# Patient Record
Sex: FEMALE | Race: WHITE | NOT HISPANIC OR LATINO | Employment: STUDENT | URBAN - METROPOLITAN AREA
[De-identification: names, ages, dates, MRNs, and addresses within clinical notes are randomized per-mention and may not be internally consistent; named-entity substitution may affect disease eponyms.]

---
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
## 2021-04-06 LAB — EXT SARS-COV-2: NEGATIVE

## 2021-05-15 ENCOUNTER — IMMUNIZATIONS (OUTPATIENT)
Dept: FAMILY MEDICINE CLINIC | Facility: HOSPITAL | Age: 21
End: 2021-05-15

## 2021-05-15 DIAGNOSIS — Z23 ENCOUNTER FOR IMMUNIZATION: Primary | ICD-10-CM

## 2021-05-15 PROCEDURE — 0001A SARS-COV-2 / COVID-19 MRNA VACCINE (PFIZER-BIONTECH) 30 MCG: CPT

## 2021-05-15 PROCEDURE — 91300 SARS-COV-2 / COVID-19 MRNA VACCINE (PFIZER-BIONTECH) 30 MCG: CPT

## 2021-06-12 ENCOUNTER — IMMUNIZATIONS (OUTPATIENT)
Dept: FAMILY MEDICINE CLINIC | Facility: HOSPITAL | Age: 21
End: 2021-06-12

## 2021-06-12 DIAGNOSIS — Z23 ENCOUNTER FOR IMMUNIZATION: Primary | ICD-10-CM

## 2021-06-12 PROCEDURE — 0002A SARS-COV-2 / COVID-19 MRNA VACCINE (PFIZER-BIONTECH) 30 MCG: CPT

## 2021-06-12 PROCEDURE — 91300 SARS-COV-2 / COVID-19 MRNA VACCINE (PFIZER-BIONTECH) 30 MCG: CPT

## 2021-08-13 ENCOUNTER — OFFICE VISIT (OUTPATIENT)
Dept: FAMILY MEDICINE CLINIC | Facility: CLINIC | Age: 21
End: 2021-08-13
Payer: COMMERCIAL

## 2021-08-13 VITALS
HEIGHT: 62 IN | BODY MASS INDEX: 20.68 KG/M2 | DIASTOLIC BLOOD PRESSURE: 76 MMHG | HEART RATE: 74 BPM | SYSTOLIC BLOOD PRESSURE: 104 MMHG | TEMPERATURE: 97.9 F | WEIGHT: 112.4 LBS | RESPIRATION RATE: 14 BRPM

## 2021-08-13 DIAGNOSIS — Z83.2 FAMILY HISTORY OF CLOTTING DISORDER: ICD-10-CM

## 2021-08-13 DIAGNOSIS — R19.8 ABNORMAL BOWEL MOVEMENT: ICD-10-CM

## 2021-08-13 DIAGNOSIS — Z00.00 ENCOUNTER FOR PHYSICAL EXAMINATION: Primary | ICD-10-CM

## 2021-08-13 DIAGNOSIS — Z13.220 LIPID SCREENING: ICD-10-CM

## 2021-08-13 PROCEDURE — 99385 PREV VISIT NEW AGE 18-39: CPT | Performed by: PHYSICIAN ASSISTANT

## 2021-08-13 PROCEDURE — 3725F SCREEN DEPRESSION PERFORMED: CPT | Performed by: PHYSICIAN ASSISTANT

## 2021-08-13 RX ORDER — ADAPALENE 3 MG/G
GEL TOPICAL
COMMUNITY
Start: 2021-05-20

## 2021-08-13 NOTE — ASSESSMENT & PLAN NOTE
Patient with problems with constipation and diarrhea and abdominal discomfort her whole life and has not seen an adult gastroenterologist   Order placed check celiac panel

## 2021-08-13 NOTE — ASSESSMENT & PLAN NOTE
Mom did bring in vaccine records that she may have on paper for this patient as she only has 1 hepatitis a vaccine which is unusual   HPV vaccine discussed highly recommended  No forms for completion today  Check fasting CMP lipid panel

## 2021-08-13 NOTE — ASSESSMENT & PLAN NOTE
Father with a history of factor 5 Leiden  Patient has been on birth control for many years now without a problem and is a nonsmoker  Check factor 5 Leiden blood titer

## 2021-08-13 NOTE — PATIENT INSTRUCTIONS
Problem List Items Addressed This Visit        Other    Encounter for physical examination - Primary      Mom did bring in vaccine records that she may have on paper for this patient as she only has 1 hepatitis a vaccine which is unusual   HPV vaccine discussed highly recommended  No forms for completion today  Check fasting CMP lipid panel  Abnormal bowel movement      Patient with problems with constipation and diarrhea and abdominal discomfort her whole life and has not seen an adult gastroenterologist   Order placed check celiac panel  Relevant Orders    Celiac Disease Antibody Profile    Ambulatory referral to Gastroenterology    Family history of clotting disorder      Father with a history of factor 5 Leiden  Patient has been on birth control for many years now without a problem and is a nonsmoker  Check factor 5 Leiden blood titer           Relevant Orders    Factor 5 leiden      Other Visit Diagnoses     Lipid screening        Relevant Orders    Lipid panel    Comprehensive metabolic panel

## 2021-08-13 NOTE — PROGRESS NOTES
[facility] PRIMARY CARE    NAME: Coby Jerome  AGE: 24 y o  SEX: female  : 2000     DATE: 2021     Assessment and Plan:     Problem List Items Addressed This Visit        Other    Encounter for physical examination - Primary      Mom did bring in vaccine records that she may have on paper for this patient as she only has 1 hepatitis a vaccine which is unusual   HPV vaccine discussed highly recommended  No forms for completion today  Check fasting CMP lipid panel  Abnormal bowel movement      Patient with problems with constipation and diarrhea and abdominal discomfort her whole life and has not seen an adult gastroenterologist   Order placed check celiac panel  Relevant Orders    Celiac Disease Antibody Profile    Ambulatory referral to Gastroenterology    Family history of clotting disorder      Father with a history of factor 5 Leiden  Patient has been on birth control for many years now without a problem and is a nonsmoker  Check factor 5 Leiden blood titer  Relevant Orders    Factor 5 leiden      Other Visit Diagnoses     Lipid screening        Relevant Orders    Lipid panel    Comprehensive metabolic panel          Immunizations and preventive care screenings were discussed with patient today  Appropriate education was printed on patient's after visit summary  Counseling:  · Alcohol/drug use: discussed moderation in alcohol intake, the recommendations for healthy alcohol use, and avoidance of illicit drug use  No follow-ups on file  Chief Complaint:     Chief Complaint   Patient presents with    Physical Exam     Patient here for annual check up and establish care      History of Present Illness:     Adult Annual Physical   Patient here for a comprehensive physical exam  The patient reports no problems  Diet and Physical Activity  · Diet/Nutrition: well balanced diet     · Exercise: no formal exercise  Depression Screening  PHQ-9 Depression Screening    PHQ-9:   Frequency of the following problems over the past two weeks:      Little interest or pleasure in doing things: 0 - not at all  Feeling down, depressed, or hopeless: 0 - not at all  PHQ-2 Score: 0       General Health  · Sleep: sleeps well  · Hearing: normal - bilateral   · Vision: goes for regular eye exams  · Dental: regular dental visits  /GYN Health  · Patient is: premenopausal  · Last menstrual period: monthly  · Contraceptive method: oral contraceptives  Review of Systems:     Review of Systems   Constitutional: Negative  HENT: Negative  Eyes: Negative  Respiratory: Negative  Cardiovascular: Negative  Gastrointestinal: Negative  Endocrine: Negative  Genitourinary: Negative  Musculoskeletal: Negative  Skin: Negative  Allergic/Immunologic: Negative  Neurological: Negative  Hematological: Negative  Psychiatric/Behavioral: Negative  Past Medical History:     Past Medical History:   Diagnosis Date    Migraines       Past Surgical History:     Past Surgical History:   Procedure Laterality Date    ADENOIDECTOMY      BREAST LUMPECTOMY Right     ESOPHAGOGASTRODUODENOSCOPY N/A 01/08/2019    Procedure: ESOPHAGOGASTRODUODENOSCOPY (EGD); Surgeon: Danuta Ronquillo MD;  Location: [facility];   Service: Pediatric Gastrointestinal    TONSILLECTOMY        Social History:     Social History     Socioeconomic History    Marital status: Single     Spouse name: None    Number of children: None    Years of education: None    Highest education level: None   Occupational History    None   Tobacco Use    Smoking status: Never Smoker    Smokeless tobacco: Never Used   Substance and Sexual Activity    Alcohol use: No    Drug use: No    Sexual activity: None   Other Topics Concern    None   Social History Narrative    None     Social Determinants of Health     Financial Resource Strain:     Difficulty of Paying Living Expenses:    Food Insecurity:     Worried About Running Out of Food in the Last Year:     [address] in the Last Year:    Transportation Needs:     Lack of Transportation (Medical):  Lack of Transportation (Non-Medical):    Physical Activity:     Days of Exercise per Week:     Minutes of Exercise per Session:    Stress:     Feeling of Stress :    Social Connections:     Frequency of Communication with Friends and Family:     Frequency of Social Gatherings with Friends and Family:     Attends Mu-ism Services:     Active Member of Clubs or Organizations:     Attends Club or Organization Meetings:     Marital Status:    Intimate Partner Violence:     Fear of Current or Ex-Partner:     Emotionally Abused:     Physically Abused:     Sexually Abused:       Family History:     Family History   Problem Relation Age of Onset    Arthritis Mother     Alcohol abuse Father     Bleeding Disorder Father     Heart disease Father     Breast cancer Maternal Grandmother     Kidney disease Maternal Uncle       Current Medications:     Current Outpatient Medications   Medication Sig Dispense Refill    Adapalene 0 3 % gel PLEASE SEE ATTACHED FOR DETAILED DIRECTIONS      clotrimazole-betamethasone (LOTRISONE) 1-0 05 % cream Apply topically 2 (two) times a day 30 g 0    SRONYX 0 1-20 MG-MCG per tablet Take 1 tablet by mouth daily  13    polyethylene glycol (GLYCOLAX) powder Take 1 capful by mouth daily (Patient not taking: Reported on 7/23/2020) 500 g 3    psyllium (METAMUCIL) 58 6 % powder Take 1 packet by mouth 3 (three) times a day (Patient not taking: Reported on 7/23/2020) 1 Bottle 3     No current facility-administered medications for this visit        Allergies:     No Known Allergies   Physical Exam:     /76 (BP Location: Left arm, Patient Position: Sitting, Cuff Size: Adult)   Pulse 74   Temp 97 9 °F (36 6 °C) (Temporal)   Resp 14   Ht 5' 2" (1 575 m)   Wt 51 kg (112 lb 6 4 oz)   BMI 20 56 kg/m²     Physical Exam  Vitals and nursing note reviewed  Constitutional:       General: She is not in acute distress  Appearance: She is well-developed  She is not diaphoretic  HENT:      Head: Normocephalic and atraumatic  Right Ear: External ear normal       Left Ear: External ear normal       Nose: Nose normal       Mouth/Throat:      Pharynx: No oropharyngeal exudate  Eyes:      General: No scleral icterus  Right eye: No discharge  Left eye: No discharge  Conjunctiva/sclera: Conjunctivae normal       Pupils: Pupils are equal, round, and reactive to light  Neck:      Thyroid: No thyromegaly  Vascular: No JVD  Trachea: No tracheal deviation  Cardiovascular:      Rate and Rhythm: Normal rate and regular rhythm  Heart sounds: Normal heart sounds  No murmur heard  No friction rub  No gallop  Pulmonary:      Effort: Pulmonary effort is normal  No respiratory distress  Breath sounds: Normal breath sounds  No stridor  No wheezing or rales  Chest:      Chest wall: No tenderness  Abdominal:      General: Bowel sounds are normal  There is no distension  Palpations: Abdomen is soft  There is no mass  Tenderness: There is no abdominal tenderness  There is no guarding or rebound  Hernia: No hernia is present  Musculoskeletal:         General: No deformity  Normal range of motion  Cervical back: Normal range of motion and neck supple  Lymphadenopathy:      Cervical: No cervical adenopathy  Skin:     General: Skin is warm and dry  Capillary Refill: Capillary refill takes more than 3 seconds  Findings: No rash  Neurological:      Mental Status: She is alert and oriented to person, place, and time  Motor: No abnormal muscle tone        Coordination: Coordination normal       Deep Tendon Reflexes: Reflexes normal    Psychiatric:         Behavior: Behavior normal  Thought Content:  Thought content normal          Judgment: Judgment normal               Woodrow Marroquin PA-C  [facility] PRIMARY CARE

## 2021-08-17 ENCOUNTER — OFFICE VISIT (OUTPATIENT)
Dept: URGENT CARE | Facility: CLINIC | Age: 21
End: 2021-08-17
Payer: COMMERCIAL

## 2021-08-17 VITALS
DIASTOLIC BLOOD PRESSURE: 78 MMHG | BODY MASS INDEX: 21.27 KG/M2 | RESPIRATION RATE: 16 BRPM | WEIGHT: 115.6 LBS | TEMPERATURE: 98.4 F | SYSTOLIC BLOOD PRESSURE: 118 MMHG | HEART RATE: 73 BPM | OXYGEN SATURATION: 99 % | HEIGHT: 62 IN

## 2021-08-17 DIAGNOSIS — H83.03 LABYRINTHITIS OF BOTH EARS: Primary | ICD-10-CM

## 2021-08-17 PROCEDURE — 99213 OFFICE O/P EST LOW 20 MIN: CPT | Performed by: PHYSICIAN ASSISTANT

## 2021-08-17 RX ORDER — MECLIZINE HYDROCHLORIDE 25 MG/1
25 TABLET ORAL EVERY 12 HOURS PRN
Qty: 30 TABLET | Refills: 0 | Status: SHIPPED | OUTPATIENT
Start: 2021-08-17 | End: 2022-04-08

## 2021-08-17 NOTE — LETTER
August 17, 2021     Patient: Robert Combs   YOB: 2000   Date of Visit: 8/17/2021       To Whom It May Concern: It is my medical opinion that Xiomara De Anda should remain out of work until 8/19/2021  If you have any questions or concerns, please don't hesitate to call           Sincerely,        Greg Schilling PA-C    CC: No Recipients

## 2021-08-17 NOTE — PROGRESS NOTES
[facility]        NAME: Jackson Aguilar is a 24 y o  female  : 2000    MRN: [id number]  DATE: 2021  TIME: 9:30 AM    /78   Pulse 73   Temp 98 4 °F (36 9 °C) (Tympanic)   Resp 16   Ht 5' 2" (1 575 m)   Wt 52 4 kg (115 lb 9 6 oz)   SpO2 99%   BMI 21 14 kg/m²     Assessment and Plan   Labyrinthitis of both ears [H83 03]  1  Labyrinthitis of both ears  meclizine (ANTIVERT) 25 mg tablet         Patient Instructions       Follow up with PCP in 3-5 days  Proceed to  ER if symptoms worsen  Chief Complaint     Chief Complaint   Patient presents with    Dizziness     Onset last night felt dizzy and metallic taste in mouth  Woke up to feeling worse with nausea  Has not eaten or drank anything today  Did eat normal yesterday  History of Present Illness       Pt with dizziness since last nicole, pt also with metallic taste in mouth since yesterday    Dizziness  This is a new problem  The current episode started yesterday  The problem occurs constantly  The problem has been unchanged  Associated symptoms include congestion  Pertinent negatives include no abdominal pain  Exacerbated by: worse with turning head  She has tried nothing for the symptoms  The treatment provided no relief  Review of Systems   Review of Systems   Constitutional: Negative  HENT: Positive for congestion  Eyes: Negative  Respiratory: Negative  Cardiovascular: Negative  Gastrointestinal: Negative  Negative for abdominal pain  Endocrine: Negative  Genitourinary: Negative  Musculoskeletal: Negative  Skin: Negative  Allergic/Immunologic: Negative  Neurological: Positive for dizziness  Hematological: Negative  All other systems reviewed and are negative          Current Medications       Current Outpatient Medications:     SRONYX 0 1-20 MG-MCG per tablet, Take 1 tablet by mouth daily, Disp: , Rfl: 13    Adapalene 0 3 % gel, PLEASE SEE ATTACHED FOR DETAILED DIRECTIONS (Patient not taking: Reported on 8/17/2021), Disp: , Rfl:     clotrimazole-betamethasone (LOTRISONE) 1-0 05 % cream, Apply topically 2 (two) times a day (Patient not taking: Reported on 8/17/2021), Disp: 30 g, Rfl: 0    meclizine (ANTIVERT) 25 mg tablet, Take 1 tablet (25 mg total) by mouth every 12 (twelve) hours as needed for dizziness, Disp: 30 tablet, Rfl: 0    polyethylene glycol (GLYCOLAX) powder, Take 1 capful by mouth daily (Patient not taking: Reported on 7/23/2020), Disp: 500 g, Rfl: 3    psyllium (METAMUCIL) 58 6 % powder, Take 1 packet by mouth 3 (three) times a day (Patient not taking: Reported on 7/23/2020), Disp: 1 Bottle, Rfl: 3    Current Allergies     Allergies as of 08/17/2021    (No Known Allergies)            The following portions of the patient's history were reviewed and updated as appropriate: allergies, current medications, past family history, past medical history, past social history, past surgical history and problem list      Past Medical History:   Diagnosis Date    Migraines        Past Surgical History:   Procedure Laterality Date    ADENOIDECTOMY      BREAST LUMPECTOMY Right     ESOPHAGOGASTRODUODENOSCOPY N/A 01/08/2019    Procedure: ESOPHAGOGASTRODUODENOSCOPY (EGD); Surgeon: Bruna Glover MD;  Location: [facility]; Service: Pediatric Gastrointestinal    TONSILLECTOMY         Family History   Problem Relation Age of Onset    Arthritis Mother     Alcohol abuse Father     Bleeding Disorder Father     Heart disease Father     Breast cancer Maternal Grandmother     Kidney disease Maternal Uncle          Medications have been verified  Objective   /78   Pulse 73   Temp 98 4 °F (36 9 °C) (Tympanic)   Resp 16   Ht 5' 2" (1 575 m)   Wt 52 4 kg (115 lb 9 6 oz)   SpO2 99%   BMI 21 14 kg/m²        Physical Exam     Physical Exam  Vitals and nursing note reviewed  Constitutional:       Appearance: Normal appearance  She is normal weight     HENT: Head: Normocephalic and atraumatic  Right Ear: Tympanic membrane, ear canal and external ear normal       Left Ear: Tympanic membrane, ear canal and external ear normal       Nose: Nose normal       Mouth/Throat:      Mouth: Mucous membranes are moist       Pharynx: Oropharynx is clear  Eyes:      Conjunctiva/sclera: Conjunctivae normal       Pupils: Pupils are equal, round, and reactive to light  Cardiovascular:      Rate and Rhythm: Normal rate and regular rhythm  Pulses: Normal pulses  Heart sounds: Normal heart sounds  Pulmonary:      Effort: Pulmonary effort is normal       Breath sounds: Normal breath sounds  Abdominal:      General: Abdomen is flat  Bowel sounds are normal       Palpations: Abdomen is soft  Musculoskeletal:         General: Normal range of motion  Cervical back: Normal range of motion and neck supple  Skin:     General: Skin is warm  Capillary Refill: Capillary refill takes less than 2 seconds  Neurological:      General: No focal deficit present  Mental Status: She is alert and oriented to person, place, and time     Psychiatric:         Mood and Affect: Mood normal          Behavior: Behavior normal

## 2021-08-17 NOTE — PATIENT INSTRUCTIONS
Labyrinthitis   WHAT YOU NEED TO KNOW:   Labyrinthitis is an inflammation of the labyrinth or nerves in the inner ear  The labyrinth helps you hear and keep your balance  Symptoms can make it difficult to walk or do your normal activities but are not life-threatening  DISCHARGE INSTRUCTIONS:   Call your local emergency number (911 in the [address]) or have someone call if:   · You have trouble speaking or thinking clearly  · You have vision changes or shortness of breath  Return to the emergency department if:   · You are not able to keep any fluids, food, or medicine down without vomiting  · You have a dry mouth or cracked lips  · You have a headache, stiff neck, and a fever  · Your heartbeat is fast or irregular  · You are not able to urinate  · You have blood, pus, or fluid coming out of your ears  · You have dizzy spells that last longer than they usually do  · You have any weak or numb areas  Call your doctor if:   · You have a fever  · You have ear pain  · You feel weak, tired, or lose weight without trying  · Your symptoms keep coming back or get worse  · You have questions or concerns about your condition or care  Medicines: You may need any of the following:  · Antivertigo medicine  helps you feel less dizzy  · Antinausea medicine  helps calm your stomach and prevents vomiting  · Antiviral medicine  treats a viral infection  · Antibiotics  treat a bacterial infection  · Steroids  decrease inflammation in the inner ear  · Take your medicine as directed  Contact your healthcare provider if you think your medicine is not helping or if you have side effects  Tell him of her if you are allergic to any medicine  Keep a list of the medicines, vitamins, and herbs you take  Include the amounts, and when and why you take them  Bring the list or the pill bottles to follow-up visits  Carry your medicine list with you in case of an emergency      Manage labyrinthitis:   · Do the following when you have signs and symptoms of labyrinthitis:      ? Be calm and take slow, deep breaths  ? Sit or lie down right away when you feel dizzy  ? Keep your head as still as possible and do not change positions quickly  Move slowly and let yourself get used to one position before moving to another position  ? Do not walk without help, drive a car, or operate heavy machinery when you feel dizzy  · Make your home safe to prevent falls  Use a 4-pronged cane or walker to help you keep your balance when you walk  Remove loose carpeting from the floor to reduce your risk for a fall  Use chairs with side arms and hard cushions to make it easier to get up or out of a chair  Put grab bars on the walls beside toilets and inside showers and bathtubs  These will help you get up and help prevent falls  You may want to put a shower chair inside the shower  · Use vestibular and balance rehabilitation therapy (VBRT), if directed  Therapy may be done with a physical therapist or at home  VBRT includes movement exercises while you are sitting or standing  These exercises will make you dizzy, but can also help your brain adapt to the triggers that are causing your vertigo  Over time, this therapy may help you have vertigo less often and also improve your balance  For more information:   · [employer]est[employer]   P O  [address] [facility] , [address] [place] [address]  Phone: [phone]  Phone: [phone]    Follow up with your doctor as directed:  Write down your questions so you remember to ask them during your visits  © Copyright [employer] 2021 Information is for End User's use only and may not be sold, redistributed or otherwise used for commercial purposes  All illustrations and images included in CareNotes® are the copyrighted property of [employer]  or [facility] Jeannine Oconnor   The above information is an  only   It is not intended as medical advice for individual conditions or treatments  Talk to your doctor, nurse or pharmacist before following any medical regimen to see if it is safe and effective for you

## 2021-08-20 ENCOUNTER — OFFICE VISIT (OUTPATIENT)
Dept: FAMILY MEDICINE CLINIC | Facility: CLINIC | Age: 21
End: 2021-08-20
Payer: COMMERCIAL

## 2021-08-20 VITALS
HEART RATE: 90 BPM | SYSTOLIC BLOOD PRESSURE: 112 MMHG | WEIGHT: 114.2 LBS | RESPIRATION RATE: 14 BRPM | HEIGHT: 62 IN | TEMPERATURE: 99 F | DIASTOLIC BLOOD PRESSURE: 84 MMHG | BODY MASS INDEX: 21.02 KG/M2

## 2021-08-20 DIAGNOSIS — R42 VERTIGO: Primary | ICD-10-CM

## 2021-08-20 PROCEDURE — 3008F BODY MASS INDEX DOCD: CPT | Performed by: PHYSICIAN ASSISTANT

## 2021-08-20 PROCEDURE — 1036F TOBACCO NON-USER: CPT | Performed by: PHYSICIAN ASSISTANT

## 2021-08-20 PROCEDURE — 99213 OFFICE O/P EST LOW 20 MIN: CPT | Performed by: PHYSICIAN ASSISTANT

## 2021-08-20 NOTE — ASSESSMENT & PLAN NOTE
Seen in urgent care  Take only 1/2 of Antivert and three times a day instead of twice dialy  Huy given for PT vestibular eval if needed

## 2021-08-20 NOTE — PROGRESS NOTES
Assessment and Plan:    Problem List Items Addressed This Visit        Other    Vertigo - Primary     Seen in urgent care  Take only 1/2 of Antivert and three times a day instead of twice dialy  Huy given for PT vestibular eval if needed  Relevant Orders    Ambulatory referral to Physical Therapy                 Diagnoses and all orders for this visit:    Vertigo  -     Ambulatory referral to Physical Therapy; Future              Subjective:      Patient ID: Wei Lamb is a 24 y o  female  CC:    Chief Complaint   Patient presents with    Follow-up     Patient here for UC follow up for inner ear infection  Pt states sxs are diizziness, nausea, metallic taste in mouth and headache       HPI:     Since Tuesday patient has been very dizzy with a severe headache with nausea  She states that she went to urgent care and they told her she had a inner ear infection and gave her Antivert 25 mg she states has gotten better with this she did not take any today but she states it makes her very very drowsy  The following portions of the patient's history were reviewed and updated as appropriate: allergies, current medications, past family history, past medical history, past social history, past surgical history and problem list       Review of Systems   Constitutional: Negative  Negative for chills and fever  HENT: Negative  Negative for ear pain and sore throat  Eyes: Negative  Negative for pain and visual disturbance  Respiratory: Negative  Negative for cough and shortness of breath  Cardiovascular: Negative  Negative for chest pain and palpitations  Gastrointestinal: Positive for nausea  Negative for abdominal pain and vomiting  Endocrine: Negative  Genitourinary: Negative  Negative for dysuria and hematuria  Musculoskeletal: Negative  Negative for arthralgias and back pain  Skin: Negative  Negative for color change and rash  Allergic/Immunologic: Negative  Neurological: Positive for dizziness and headaches (start neck base, occipital area towards frontal area of head)  Negative for seizures and syncope  Hematological: Negative  Psychiatric/Behavioral: Negative  All other systems reviewed and are negative  Data to review:       Objective:    Vitals:    08/20/21 1406   BP: 112/84   BP Location: Left arm   Patient Position: Sitting   Cuff Size: Adult   Pulse: 90   Resp: 14   Temp: 99 °F (37 2 °C)   TempSrc: Temporal   Weight: 51 8 kg (114 lb 3 2 oz)   Height: 5' 2" (1 575 m)        Physical Exam  Vitals and nursing note reviewed  Constitutional:       Appearance: Normal appearance  She is well-developed  HENT:      Head: Normocephalic and atraumatic  Right Ear: Hearing, tympanic membrane, ear canal and external ear normal       Left Ear: Hearing, tympanic membrane, ear canal and external ear normal    Eyes:      General: Lids are normal       Extraocular Movements:      Left eye: Nystagmus present  Conjunctiva/sclera: Conjunctivae normal       Pupils: Pupils are equal, round, and reactive to light  Cardiovascular:      Rate and Rhythm: Normal rate and regular rhythm  Heart sounds: No murmur heard  Pulmonary:      Effort: Pulmonary effort is normal       Breath sounds: Normal breath sounds  Skin:     General: Skin is warm and dry  Neurological:      General: No focal deficit present  Mental Status: She is alert  Coordination: Coordination is intact  Comments:   Patient does have dizziness with reclining to head to the right an horizontal nystagmus bilaterally with head turning  Psychiatric:         Mood and Affect: Mood normal          Behavior: Behavior normal  Behavior is cooperative  Thought Content:  Thought content normal          Judgment: Judgment normal

## 2021-08-27 ENCOUNTER — LAB (OUTPATIENT)
Dept: LAB | Facility: CLINIC | Age: 21
End: 2021-08-27
Payer: COMMERCIAL

## 2021-08-27 DIAGNOSIS — R19.8 ABNORMAL BOWEL MOVEMENT: ICD-10-CM

## 2021-08-27 DIAGNOSIS — Z83.2 FAMILY HISTORY OF CLOTTING DISORDER: ICD-10-CM

## 2021-08-27 LAB
ALBUMIN SERPL BCP-MCNC: 3.9 G/DL (ref 3.5–5)
ALP SERPL-CCNC: 76 U/L (ref 46–384)
ALT SERPL W P-5'-P-CCNC: 21 U/L (ref 12–78)
ANION GAP SERPL CALCULATED.3IONS-SCNC: 3 MMOL/L (ref 4–13)
AST SERPL W P-5'-P-CCNC: 17 U/L (ref 5–45)
BILIRUB SERPL-MCNC: 0.72 MG/DL (ref 0.2–1)
BUN SERPL-MCNC: 13 MG/DL (ref 5–25)
CALCIUM SERPL-MCNC: 9.5 MG/DL (ref 8.3–10.1)
CHLORIDE SERPL-SCNC: 109 MMOL/L (ref 100–108)
CHOLEST SERPL-MCNC: 200 MG/DL (ref 50–200)
CO2 SERPL-SCNC: 27 MMOL/L (ref 21–32)
CREAT SERPL-MCNC: 0.76 MG/DL (ref 0.6–1.3)
GFR SERPL CREATININE-BSD FRML MDRD: 114 ML/MIN/1.73SQ M
GLUCOSE P FAST SERPL-MCNC: 81 MG/DL (ref 65–99)
HDLC SERPL-MCNC: 85 MG/DL
LDLC SERPL CALC-MCNC: 103 MG/DL (ref 0–100)
NONHDLC SERPL-MCNC: 115 MG/DL
POTASSIUM SERPL-SCNC: 4.3 MMOL/L (ref 3.5–5.3)
PROT SERPL-MCNC: 7.1 G/DL (ref 6.4–8.2)
SODIUM SERPL-SCNC: 139 MMOL/L (ref 136–145)
TRIGL SERPL-MCNC: 60 MG/DL

## 2021-08-27 PROCEDURE — 80053 COMPREHEN METABOLIC PANEL: CPT | Performed by: PHYSICIAN ASSISTANT

## 2021-08-27 PROCEDURE — 82784 ASSAY IGA/IGD/IGG/IGM EACH: CPT

## 2021-08-27 PROCEDURE — 83516 IMMUNOASSAY NONANTIBODY: CPT

## 2021-08-27 PROCEDURE — 81241 F5 GENE: CPT

## 2021-08-27 PROCEDURE — 36415 COLL VENOUS BLD VENIPUNCTURE: CPT | Performed by: PHYSICIAN ASSISTANT

## 2021-08-27 PROCEDURE — 80061 LIPID PANEL: CPT | Performed by: PHYSICIAN ASSISTANT

## 2021-08-27 PROCEDURE — 86255 FLUORESCENT ANTIBODY SCREEN: CPT

## 2021-08-28 LAB
ENDOMYSIUM IGA SER QL: NEGATIVE
GLIADIN PEPTIDE IGA SER-ACNC: 2 UNITS (ref 0–19)
GLIADIN PEPTIDE IGG SER-ACNC: 2 UNITS (ref 0–19)
IGA SERPL-MCNC: 152 MG/DL (ref 87–352)
TTG IGA SER-ACNC: <2 U/ML (ref 0–3)
TTG IGG SER-ACNC: <2 U/ML (ref 0–5)

## 2021-09-01 LAB — F5 GENE MUT ANL BLD/T: ABNORMAL

## 2021-09-01 NOTE — RESULT ENCOUNTER NOTE
Please let pt know that it does look like she does have one factor five mutation  As she has been on oral birth control for a few years now w/o evidence of clot I am ordering her to have a consult with hematology to discuss

## 2021-09-02 ENCOUNTER — TELEPHONE (OUTPATIENT)
Dept: HEMATOLOGY ONCOLOGY | Facility: CLINIC | Age: 21
End: 2021-09-02

## 2021-09-02 NOTE — TELEPHONE ENCOUNTER
New Patient Encounter    New Patient Intake Form   Patient Details:  Izabel Xie  2000  [id number]    Background Information:  [address] starts by opening a telephone encounter and gathering the following information   Who is calling to schedule? If not self, relationship to patient? Mother   Referring Provider Edwin George PA-C   What is the diagnosis? Factor 5 Leiden mutation, heterozygous [facility])   Is this Cancer or Non-Cancer? Non-Cancer   Is this diagnosis confirmed? Yes   When was the diagnosis? 08/2021   Is there a confirmed diagnosis from a biopsy/tissue reviewed by pathology? No   Were outside slides requested? No   Is patient aware of diagnosis? Yes   Is there a personal history and what kind? No   Is there a family history and what kind? Yes - Father (Factor 5 leiden)           Grandmother (mom side had breast cancer)   Reason for visit? New Diagnosis   Have you had any imaging or labs done? If so: when, where? yes  08/27 at [address] records [facility]? yes   Are records needed form an outside facility? No   If yes, name, city, state where facility is located  If patient has a prior history of cancer were old records obtained? No   Was the patient told to bring a disk? No   Does the patient smoke or Vape? No   If yes, how many packs or cartridges per day? Scheduling Information:   Preferred [place]:  Any     Are there any dates/time the patient cannot be seen?     Miscellaneous:

## 2021-09-17 ENCOUNTER — CONSULT (OUTPATIENT)
Dept: HEMATOLOGY ONCOLOGY | Facility: CLINIC | Age: 21
End: 2021-09-17
Payer: COMMERCIAL

## 2021-09-17 VITALS
SYSTOLIC BLOOD PRESSURE: 112 MMHG | WEIGHT: 111.8 LBS | HEIGHT: 62 IN | HEART RATE: 76 BPM | RESPIRATION RATE: 18 BRPM | TEMPERATURE: 99.3 F | DIASTOLIC BLOOD PRESSURE: 62 MMHG | OXYGEN SATURATION: 99 % | BODY MASS INDEX: 20.57 KG/M2

## 2021-09-17 DIAGNOSIS — D68.51 FACTOR 5 LEIDEN MUTATION, HETEROZYGOUS (HCC): ICD-10-CM

## 2021-09-17 PROCEDURE — 3008F BODY MASS INDEX DOCD: CPT | Performed by: INTERNAL MEDICINE

## 2021-09-17 PROCEDURE — 99204 OFFICE O/P NEW MOD 45 MIN: CPT | Performed by: INTERNAL MEDICINE

## 2021-09-17 NOTE — PROGRESS NOTES
Hematology / Oncology Outpatient Consult Note    Curly Cortez 24 y o  female DOB2000 [id number]         Date:  9/17/2021    Assessment / Plan:    A 61-year-old premenopausal woman with heterozygote Factor 5 Leiden mutation without personal history of DVT  However, she has family history of DVT  Her father had unprovoked lower extremity DVT at age of 43  We discussed the heterozygote Factor 5 Leiden mutation in general as well as its implication and possible treatment intervention  On a routine basis, it is not indicated for her to take anticoagulation  Regarding the birth control pill, I strongly prefer that she does not take birth control pill  I recommended her to discuss alternative contraception with OBGYN  We also discussed the possible anticoagulation during the pregnancy  Since she has family history of unprovoked DVT before the age of 48 in her father, prophylactic anticoagulation with low-dose Lovenox can be considered during the pregnancy as well as 6 weeks after the postpartum  I recommended her to see hematologist once she is pregnant in the future to discuss this  She is in agreement with my recommendation  All the patient and her mother's questions were answered to their satisfaction  Subjective:     HPI:    A 24years old premenopausal woman who was recently found to have heterozygote Factor 5 Leiden mutation  Therefore, she presents today with her mother to discuss any intervention now as well as in the future  She was tested because her father who has history of unprovoked lower extremity DVT at age of 43 was found to have Factor 5 Leiden mutation  She has no personal history of venous thromboembolism  She has no past medical history  However, she takes birth control pill in the last 3-4 years  She has no major surgical history  Her maternal grandfather also has history of DVT  She is a lifetime never smoker  She does not drink alcohol    Currently, she has no symptoms  She feels well  She denied any pain  She has no respiratory symptoms  Her weight is stable  Her performance status is normal         Interval History:          Objective:     Primary Diagnosis:      Heterozygote Factor 5 Leiden mutation with no personal history of venous thromboembolism  Cancer Staging:  Cancer Staging  No matching staging information was found for the patient  Previous Hematologic/ Oncologic Treatment:         Current Hematologic/ Oncologic Treatment:        Observation  Disease Status:     NA    Test Results:    Pathology:        Radiology:        Laboratory:      Heterozygote Factor 5 Leiden mutation  CBC and CMP are within normal limits  Physical Exam:      General Appearance:    Alert, oriented        Eyes:    PERRL   Ears:    Normal external ear canals, both ears   Nose:   Nares normal, septum midline   Throat:   Mucosa moist  Pharynx without injection  Neck:   Supple       Lungs:     Clear to auscultation bilaterally   Chest Wall:    No tenderness or deformity    Heart:    Regular rate and rhythm       Abdomen:     Soft, non-tender, bowel sounds +, no organomegaly           Extremities:   Extremities no cyanosis or edema       Skin:   no rash or icterus  Lymph nodes:   Cervical, supraclavicular, and axillary nodes normal   Neurologic:   CNII-XII intact, normal strength, sensation and reflexes     Throughout          Breast exam:   NA         ROS: Review of Systems   All other systems reviewed and are negative  Imaging: No results found        Labs:   Lab Results   Component Value Date    WBC 5 02 01/05/2019    HGB 14 3 01/05/2019    HCT 43 7 01/05/2019    MCV 91 01/05/2019     01/05/2019     Lab Results   Component Value Date    K 4 3 08/27/2021     (H) 08/27/2021    CO2 27 08/27/2021    BUN 13 08/27/2021    CREATININE 0 76 08/27/2021    GLUF 81 08/27/2021    CALCIUM 9 5 08/27/2021    AST 17 08/27/2021    ALT 21 08/27/2021    ALKPHOS 76 08/27/2021    EGFR 114 08/27/2021         Vital Sign:    Body surface area is 1 49 meters squared  Wt Readings from Last 3 Encounters:   09/17/21 50 7 kg (111 lb 12 8 oz) (18 %, Z= -0 91)*   08/20/21 51 8 kg (114 lb 3 2 oz) (23 %, Z= -0 75)*   08/17/21 52 4 kg (115 lb 9 6 oz) (25 %, Z= -0 66)*     * Growth percentiles are based on CDC (Girls, 2-20 Years) data  Temp Readings from Last 3 Encounters:   09/17/21 99 3 °F (37 4 °C) (Tympanic Core)   08/20/21 99 °F (37 2 °C) (Temporal)   08/17/21 98 4 °F (36 9 °C) (Tympanic)        BP Readings from Last 3 Encounters:   09/17/21 112/62   08/20/21 112/84   08/17/21 118/78         Pulse Readings from Last 3 Encounters:   09/17/21 76   08/20/21 90   08/17/21 73     @LASTSAO2(3)@    Active Problems:   Patient Active Problem List   Diagnosis    Generalized abdominal pain    Dyspepsia    Abdominal pain    Encounter for physical examination    Abnormal bowel movement    Family history of clotting disorder    Vertigo    [facility]       Past Medical History:   Past Medical History:   Diagnosis Date    Migraines        Surgical History:   Past Surgical History:   Procedure Laterality Date    ADENOIDECTOMY      BREAST LUMPECTOMY Right     ESOPHAGOGASTRODUODENOSCOPY N/A 01/08/2019    Procedure: ESOPHAGOGASTRODUODENOSCOPY (EGD); Surgeon: Pham Song MD;  [facility]: [facility]; Service: Pediatric Gastrointestinal    TONSILLECTOMY         Family History:    Family History   Problem Relation Age of Onset    Arthritis Mother     Alcohol abuse Father     Bleeding Disorder Father     Heart disease Father     Breast cancer Maternal Grandmother     Kidney disease Maternal Uncle        Cancer-related family history includes Breast cancer in her maternal grandmother      Social History:   Social History     Socioeconomic History    Marital status: Single     Spouse name: Not on file    Number of children: Not on file    Years of education: Not on file    Highest education level: Not on file   Occupational History    Not on file   Tobacco Use    Smoking status: Never Smoker    Smokeless tobacco: Never Used   Substance and Sexual Activity    Alcohol use: No    Drug use: No    Sexual activity: Not on file   Other Topics Concern    Not on file   Social History Narrative    Not on file     Social Determinants of Health     Financial Resource Strain:     Difficulty of Paying Living Expenses:    Food Insecurity:     Worried About Running Out of Food in the Last Year:     [address] in the Last Year:    Transportation Needs:     Lack of Transportation (Medical):      Lack of Transportation (Non-Medical):    Physical Activity:     Days of Exercise per Week:     Minutes of Exercise per Session:    Stress:     Feeling of Stress :    Social Connections:     Frequency of Communication with Friends and Family:     Frequency of Social Gatherings with Friends and Family:     Attends Mor[employer] Services:     Active Member of Clubs or Organizations:     Attends Club or Organization Meetings:     Marital Status:    Intimate Partner Violence:     Fear of Current or Ex-Partner:     Emotionally Abused:     Physically Abused:     Sexually Abused:        Current Medications:   Current Outpatient Medications   Medication Sig Dispense Refill    Adapalene 0 3 % gel PLEASE SEE ATTACHED FOR DETAILED DIRECTIONS      SRONYX 0 1-20 MG-MCG per tablet Take 1 tablet by mouth daily  13    clotrimazole-betamethasone (LOTRISONE) 1-0 05 % cream Apply topically 2 (two) times a day (Patient not taking: Reported on 8/17/2021) 30 g 0    meclizine (ANTIVERT) 25 mg tablet Take 1 tablet (25 mg total) by mouth every 12 (twelve) hours as needed for dizziness (Patient not taking: Reported on 9/17/2021) 30 tablet 0    polyethylene glycol (GLYCOLAX) powder Take 1 capful by mouth daily (Patient not taking: Reported on 7/23/2020) 500 g 3    psyllium (METAMUCIL) 58 6 % powder Take 1 packet by mouth 3 (three) times a day (Patient not taking: Reported on 7/23/2020) 1 Bottle 3     No current facility-administered medications for this visit         Allergies: No Known Allergies

## 2021-09-17 NOTE — LETTER
September 17, 2021     Guillermina Quintanilla, [address] [place]  [place]ust   [address]  [phone]    Patient: Ilia Urban   YOB: 2000   Date of Visit: 9/17/2021       Dear Dr Glenys Peguero: Thank you for referring Rafael Segal to me for evaluation  Below are my notes for this consultation  If you have questions, please do not hesitate to call me  I look forward to following your patient along with you  Sincerely,        Judah Wtats MD        CC: No Recipients  Judah Watts MD  9/17/2021  2:37 PM  Sign when Signing Visit  Hematology / Oncology Outpatient Consult Note    Ilia Urban 24 y o  female DOB2000 [id number]         Date:  9/17/2021    Assessment / Plan:    A 56-year-old premenopausal woman with heterozygote Factor 5 Leiden mutation without personal history of DVT  However, she has family history of DVT  Her father had unprovoked lower extremity DVT at age of 43  We discussed the heterozygote Factor 5 Leiden mutation in general as well as its implication and possible treatment intervention  On a routine basis, it is not indicated for her to take anticoagulation  Regarding the birth control pill, I strongly prefer that she does not take birth control pill  I recommended her to discuss alternative contraception with OBGYN  We also discussed the possible anticoagulation during the pregnancy  Since she has family history of unprovoked DVT before the age of 48 in her father, prophylactic anticoagulation with low-dose Lovenox can be considered during the pregnancy as well as 6 weeks after the postpartum  I recommended her to see hematologist once she is pregnant in the future to discuss this  She is in agreement with my recommendation  All the patient and her mother's questions were answered to their satisfaction  Subjective:     HPI:    A 24years old premenopausal woman who was recently found to have heterozygote Factor 5 Leiden mutation  Therefore, she presents today with her mother to discuss any intervention now as well as in the future  She was tested because her father who has history of unprovoked lower extremity DVT at age of 43 was found to have Factor 5 Leiden mutation  She has no personal history of venous thromboembolism  She has no past medical history  However, she takes birth control pill in the last 3-4 years  She has no major surgical history  Her maternal grandfather also has history of DVT  She is a lifetime never smoker  She does not drink alcohol  Currently, she has no symptoms  She feels well  She denied any pain  She has no respiratory symptoms  Her weight is stable  Her performance status is normal         Interval History:          Objective:     Primary Diagnosis:      Heterozygote Factor 5 Leiden mutation with no personal history of venous thromboembolism  Cancer Staging:  Cancer Staging  No matching staging information was found for the patient  Previous Hematologic/ Oncologic Treatment:         Current Hematologic/ Oncologic Treatment:        Observation  Disease Status:     NA    Test Results:    Pathology:        Radiology:        Laboratory:      Heterozygote Factor 5 Leiden mutation  CBC and CMP are within normal limits  Physical Exam:      General Appearance:    Alert, oriented        Eyes:    PERRL   Ears:    Normal external ear canals, both ears   Nose:   Nares normal, septum midline   Throat:   Mucosa moist  Pharynx without injection  Neck:   Supple       Lungs:     Clear to auscultation bilaterally   Chest Wall:    No tenderness or deformity    Heart:    Regular rate and rhythm       Abdomen:     Soft, non-tender, bowel sounds +, no organomegaly           Extremities:   Extremities no cyanosis or edema       Skin:   no rash or icterus      Lymph nodes:   Cervical, supraclavicular, and axillary nodes normal   Neurologic:   CNII-XII intact, normal strength, sensation and reflexes Throughout          Breast exam:   NA         ROS: Review of Systems   All other systems reviewed and are negative  Imaging: No results found  Labs:   Lab Results   Component Value Date    WBC 5 02 01/05/2019    HGB 14 3 01/05/2019    HCT 43 7 01/05/2019    MCV 91 01/05/2019     01/05/2019     Lab Results   Component Value Date    K 4 3 08/27/2021     (H) 08/27/2021    CO2 27 08/27/2021    BUN 13 08/27/2021    CREATININE 0 76 08/27/2021    GLUF 81 08/27/2021    CALCIUM 9 5 08/27/2021    AST 17 08/27/2021    ALT 21 08/27/2021    ALKPHOS 76 08/27/2021    EGFR 114 08/27/2021         Vital Sign:    Body surface area is 1 49 meters squared  Wt Readings from Last 3 Encounters:   09/17/21 50 7 kg (111 lb 12 8 oz) (18 %, Z= -0 91)*   08/20/21 51 8 kg (114 lb 3 2 oz) (23 %, Z= -0 75)*   08/17/21 52 4 kg (115 lb 9 6 oz) (25 %, Z= -0 66)*     * Growth percentiles are based on CDC (Girls, 2-20 Years) data  Temp Readings from Last 3 Encounters:   09/17/21 99 3 °F (37 4 °C) (Tympanic Core)   08/20/21 99 °F (37 2 °C) (Temporal)   08/17/21 98 4 °F (36 9 °C) (Tympanic)        BP Readings from Last 3 Encounters:   09/17/21 112/62   08/20/21 112/84   08/17/21 118/78         Pulse Readings from Last 3 Encounters:   09/17/21 76   08/20/21 90   08/17/21 73     @LASTSAO2(3)@    Active Problems:   Patient Active Problem List   Diagnosis    Generalized abdominal pain    Dyspepsia    Abdominal pain    Encounter for physical examination    Abnormal bowel movement    Family history of clotting disorder    Vertigo    Factor 5 Leiden mutation, heterozygous ([address] )       Past Medical History:   Past Medical History:   Diagnosis Date    Migraines        Surgical History:   Past Surgical History:   Procedure Laterality Date    ADENOIDECTOMY      BREAST LUMPECTOMY Right     ESOPHAGOGASTRODUODENOSCOPY N/A 01/08/2019    Procedure: ESOPHAGOGASTRODUODENOSCOPY (EGD);   Surgeon: Kem Aase, MD;  [facility]; Service: Pediatric Gastrointestinal    TONSILLECTOMY         Family History:    Family History   Problem Relation Age of Onset    Arthritis Mother     Alcohol abuse Father     Bleeding Disorder Father     Heart disease Father     Breast cancer Maternal Grandmother     Kidney disease Maternal Uncle        Cancer-related family history includes Breast cancer in her maternal grandmother  Social History:   Social History     Socioeconomic History    Marital status: Single     Spouse name: Not on file    Number of children: Not on file    Years of education: Not on file    Highest education level: Not on file   Occupational History    Not on file   Tobacco Use    Smoking status: Never Smoker    Smokeless tobacco: Never Used   Substance and Sexual Activity    Alcohol use: No    Drug use: No    Sexual activity: Not on file   Other Topics Concern    Not on file   Social History Narrative    Not on file     Social Determinants of Health     Financial Resource Strain:     Difficulty of Paying Living Expenses:    Food Insecurity:     Worried About Running Out of Food in the Last Year:     [address] in the Last Year:    Transportation Needs:     Lack of Transportation (Medical):      Lack of Transportation (Non-Medical):    Physical Activity:     Days of Exercise per Week:     Minutes of Exercise per Session:    Stress:     Feeling of Stress :    Social Connections:     Frequency of Communication with Friends and Family:     Frequency of Social Gatherings with Friends and Family:     Attends Sikh Services:     Active Member of Clubs or Organizations:     Attends Club or Organization Meetings:     Marital Status:    Intimate Partner Violence:     Fear of Current or Ex-Partner:     Emotionally Abused:     Physically Abused:     Sexually Abused:        Current Medications:   Current Outpatient Medications   Medication Sig Dispense Refill    Adapalene 0 3 % gel PLEASE SEE ATTACHED FOR DETAILED DIRECTIONS      SRONYX 0 1-20 MG-MCG per tablet Take 1 tablet by mouth daily  13    clotrimazole-betamethasone (LOTRISONE) 1-0 05 % cream Apply topically 2 (two) times a day (Patient not taking: Reported on 8/17/2021) 30 g 0    meclizine (ANTIVERT) 25 mg tablet Take 1 tablet (25 mg total) by mouth every 12 (twelve) hours as needed for dizziness (Patient not taking: Reported on 9/17/2021) 30 tablet 0    polyethylene glycol (GLYCOLAX) powder Take 1 capful by mouth daily (Patient not taking: Reported on 7/23/2020) 500 g 3    psyllium (METAMUCIL) 58 6 % powder Take 1 packet by mouth 3 (three) times a day (Patient not taking: Reported on 7/23/2020) 1 Bottle 3     No current facility-administered medications for this visit         Allergies: No Known Allergies

## 2021-10-11 ENCOUNTER — APPOINTMENT (EMERGENCY)
Dept: ULTRASOUND IMAGING | Facility: HOSPITAL | Age: 21
End: 2021-10-11
Payer: COMMERCIAL

## 2021-10-11 ENCOUNTER — HOSPITAL ENCOUNTER (EMERGENCY)
Facility: HOSPITAL | Age: 21
Discharge: HOME | End: 2021-10-11
Attending: EMERGENCY MEDICINE | Admitting: EMERGENCY MEDICINE
Payer: COMMERCIAL

## 2021-10-11 VITALS
WEIGHT: 115 LBS | OXYGEN SATURATION: 98 % | HEIGHT: 62 IN | TEMPERATURE: 97.7 F | SYSTOLIC BLOOD PRESSURE: 143 MMHG | RESPIRATION RATE: 16 BRPM | BODY MASS INDEX: 21.16 KG/M2 | HEART RATE: 93 BPM | DIASTOLIC BLOOD PRESSURE: 84 MMHG

## 2021-10-11 DIAGNOSIS — N83.202 CYST OF LEFT OVARY: Primary | ICD-10-CM

## 2021-10-11 LAB
ALBUMIN SERPL BCP-MCNC: 4.3 G/DL (ref 3.5–5)
ALP SERPL-CCNC: 70 U/L (ref 46–384)
ALT SERPL W P-5'-P-CCNC: 22 U/L (ref 12–78)
ANION GAP SERPL CALCULATED.3IONS-SCNC: 9 MMOL/L (ref 4–13)
AST SERPL W P-5'-P-CCNC: 16 U/L (ref 5–45)
BACTERIA UR QL AUTO: NORMAL /HPF
BASOPHILS # BLD AUTO: 0.03 THOUSANDS/ΜL (ref 0–0.1)
BASOPHILS NFR BLD AUTO: 1 % (ref 0–1)
BILIRUB SERPL-MCNC: 0.4 MG/DL (ref 0.2–1)
BILIRUB UR QL STRIP: NEGATIVE
BUN SERPL-MCNC: 16 MG/DL (ref 5–25)
CALCIUM SERPL-MCNC: 9.3 MG/DL (ref 8.3–10.1)
CHLORIDE SERPL-SCNC: 106 MMOL/L (ref 100–108)
CLARITY UR: CLEAR
CO2 SERPL-SCNC: 26 MMOL/L (ref 21–32)
COLOR UR: YELLOW
COLOR, POC: 1
CREAT SERPL-MCNC: 0.75 MG/DL (ref 0.6–1.3)
EOSINOPHIL # BLD AUTO: 0.03 THOUSAND/ΜL (ref 0–0.61)
EOSINOPHIL NFR BLD AUTO: 1 % (ref 0–6)
ERYTHROCYTE [DISTWIDTH] IN BLOOD BY AUTOMATED COUNT: 12.6 % (ref 11.6–15.1)
EXT PREG TEST URINE: NEGATIVE
EXT. CONTROL ED NAV: NORMAL
GFR SERPL CREATININE-BSD FRML MDRD: 116 ML/MIN/1.73SQ M
GLUCOSE SERPL-MCNC: 88 MG/DL (ref 65–140)
GLUCOSE UR STRIP-MCNC: NEGATIVE MG/DL
HCT VFR BLD AUTO: 44.7 % (ref 34.8–46.1)
HGB BLD-MCNC: 14.8 G/DL (ref 11.5–15.4)
HGB UR QL STRIP.AUTO: ABNORMAL
IMM GRANULOCYTES # BLD AUTO: 0.02 THOUSAND/UL (ref 0–0.2)
IMM GRANULOCYTES NFR BLD AUTO: 0 % (ref 0–2)
KETONES UR STRIP-MCNC: NEGATIVE MG/DL
LEUKOCYTE ESTERASE UR QL STRIP: ABNORMAL
LIPASE SERPL-CCNC: 67 U/L (ref 73–393)
LYMPHOCYTES # BLD AUTO: 1.79 THOUSANDS/ΜL (ref 0.6–4.47)
LYMPHOCYTES NFR BLD AUTO: 35 % (ref 14–44)
MCH RBC QN AUTO: 30.3 PG (ref 26.8–34.3)
MCHC RBC AUTO-ENTMCNC: 33.1 G/DL (ref 31.4–37.4)
MCV RBC AUTO: 92 FL (ref 82–98)
MONOCYTES # BLD AUTO: 0.27 THOUSAND/ΜL (ref 0.17–1.22)
MONOCYTES NFR BLD AUTO: 5 % (ref 4–12)
NEUTROPHILS # BLD AUTO: 3.03 THOUSANDS/ΜL (ref 1.85–7.62)
NEUTS SEG NFR BLD AUTO: 58 % (ref 43–75)
NITRITE UR QL STRIP: NEGATIVE
NON-SQ EPI CELLS URNS QL MICRO: NORMAL /HPF
NRBC BLD AUTO-RTO: 0 /100 WBCS
PH UR STRIP.AUTO: 7 [PH] (ref 4.5–8)
PLATELET # BLD AUTO: 254 THOUSANDS/UL (ref 149–390)
PMV BLD AUTO: 10.6 FL (ref 8.9–12.7)
POTASSIUM SERPL-SCNC: 3.8 MMOL/L (ref 3.5–5.3)
PROT SERPL-MCNC: 7.9 G/DL (ref 6.4–8.2)
PROT UR STRIP-MCNC: NEGATIVE MG/DL
RBC # BLD AUTO: 4.88 MILLION/UL (ref 3.81–5.12)
RBC #/AREA URNS AUTO: NORMAL /HPF
SODIUM SERPL-SCNC: 141 MMOL/L (ref 136–145)
SP GR UR STRIP.AUTO: 1.02 (ref 1–1.03)
UROBILINOGEN UR QL STRIP.AUTO: 0.2 E.U./DL
WBC # BLD AUTO: 5.17 THOUSAND/UL (ref 4.31–10.16)
WBC #/AREA URNS AUTO: NORMAL /HPF

## 2021-10-11 PROCEDURE — 99284 EMERGENCY DEPT VISIT MOD MDM: CPT | Performed by: PHYSICIAN ASSISTANT

## 2021-10-11 PROCEDURE — 83690 ASSAY OF LIPASE: CPT

## 2021-10-11 PROCEDURE — 76830 TRANSVAGINAL US NON-OB: CPT

## 2021-10-11 PROCEDURE — 76856 US EXAM PELVIC COMPLETE: CPT

## 2021-10-11 PROCEDURE — 96374 THER/PROPH/DIAG INJ IV PUSH: CPT

## 2021-10-11 PROCEDURE — 85025 COMPLETE CBC W/AUTO DIFF WBC: CPT

## 2021-10-11 PROCEDURE — 36415 COLL VENOUS BLD VENIPUNCTURE: CPT

## 2021-10-11 PROCEDURE — 81025 URINE PREGNANCY TEST: CPT | Performed by: PHYSICIAN ASSISTANT

## 2021-10-11 PROCEDURE — 80053 COMPREHEN METABOLIC PANEL: CPT

## 2021-10-11 PROCEDURE — 81001 URINALYSIS AUTO W/SCOPE: CPT

## 2021-10-11 PROCEDURE — 99284 EMERGENCY DEPT VISIT MOD MDM: CPT

## 2021-10-11 RX ORDER — KETOROLAC TROMETHAMINE 30 MG/ML
15 INJECTION, SOLUTION INTRAMUSCULAR; INTRAVENOUS ONCE
Status: COMPLETED | OUTPATIENT
Start: 2021-10-11 | End: 2021-10-11

## 2021-10-11 RX ADMIN — KETOROLAC TROMETHAMINE 15 MG: 30 INJECTION, SOLUTION INTRAMUSCULAR; INTRAVENOUS at 10:58

## 2021-10-12 ENCOUNTER — APPOINTMENT (EMERGENCY)
Dept: CT IMAGING | Facility: HOSPITAL | Age: 21
End: 2021-10-12
Payer: COMMERCIAL

## 2021-10-12 ENCOUNTER — APPOINTMENT (EMERGENCY)
Dept: ULTRASOUND IMAGING | Facility: HOSPITAL | Age: 21
End: 2021-10-12
Payer: COMMERCIAL

## 2021-10-12 ENCOUNTER — HOSPITAL ENCOUNTER (EMERGENCY)
Facility: HOSPITAL | Age: 21
Discharge: HOME | End: 2021-10-12
Attending: EMERGENCY MEDICINE | Admitting: EMERGENCY MEDICINE
Payer: COMMERCIAL

## 2021-10-12 VITALS
HEART RATE: 88 BPM | HEIGHT: 62 IN | RESPIRATION RATE: 18 BRPM | BODY MASS INDEX: 21.16 KG/M2 | TEMPERATURE: 97.7 F | DIASTOLIC BLOOD PRESSURE: 81 MMHG | OXYGEN SATURATION: 99 % | WEIGHT: 115 LBS | SYSTOLIC BLOOD PRESSURE: 139 MMHG

## 2021-10-12 DIAGNOSIS — N83.202 LEFT OVARIAN CYST: ICD-10-CM

## 2021-10-12 DIAGNOSIS — R10.9 ABDOMINAL PAIN: Primary | ICD-10-CM

## 2021-10-12 LAB
BACTERIA UR QL AUTO: ABNORMAL /HPF
BILIRUB UR QL STRIP: NEGATIVE
BILIRUB UR QL STRIP: NEGATIVE
CLARITY UR: CLEAR
CLARITY UR: CLEAR
COLOR UR: YELLOW
COLOR UR: YELLOW
EXT PREG TEST URINE: NEGATIVE
EXT. CONTROL ED NAV: NORMAL
GLUCOSE UR STRIP-MCNC: NEGATIVE MG/DL
GLUCOSE UR STRIP-MCNC: NEGATIVE MG/DL
HGB UR QL STRIP.AUTO: ABNORMAL
HGB UR QL STRIP.AUTO: ABNORMAL
KETONES UR STRIP-MCNC: ABNORMAL MG/DL
KETONES UR STRIP-MCNC: ABNORMAL MG/DL
LEUKOCYTE ESTERASE UR QL STRIP: NEGATIVE
LEUKOCYTE ESTERASE UR QL STRIP: NEGATIVE
MUCOUS THREADS UR QL AUTO: ABNORMAL
NITRITE UR QL STRIP: NEGATIVE
NITRITE UR QL STRIP: NEGATIVE
NON-SQ EPI CELLS URNS QL MICRO: ABNORMAL /HPF
OTHER CASTS: ABNORMAL
PH UR STRIP.AUTO: 5.5 [PH] (ref 4.5–8)
PH UR STRIP.AUTO: 6 [PH]
PROT UR STRIP-MCNC: NEGATIVE MG/DL
PROT UR STRIP-MCNC: NEGATIVE MG/DL
RBC #/AREA URNS AUTO: ABNORMAL /HPF
SP GR UR STRIP.AUTO: 1.02 (ref 1–1.03)
SP GR UR STRIP.AUTO: 1.02 (ref 1–1.03)
UROBILINOGEN UR QL STRIP.AUTO: 0.2 E.U./DL
UROBILINOGEN UR QL STRIP.AUTO: 0.2 E.U./DL
WBC #/AREA URNS AUTO: ABNORMAL /HPF

## 2021-10-12 PROCEDURE — 81001 URINALYSIS AUTO W/SCOPE: CPT | Performed by: PHYSICIAN ASSISTANT

## 2021-10-12 PROCEDURE — 96372 THER/PROPH/DIAG INJ SC/IM: CPT

## 2021-10-12 PROCEDURE — 99284 EMERGENCY DEPT VISIT MOD MDM: CPT

## 2021-10-12 PROCEDURE — 99284 EMERGENCY DEPT VISIT MOD MDM: CPT | Performed by: PHYSICIAN ASSISTANT

## 2021-10-12 PROCEDURE — 81025 URINE PREGNANCY TEST: CPT | Performed by: PHYSICIAN ASSISTANT

## 2021-10-12 PROCEDURE — 74176 CT ABD & PELVIS W/O CONTRAST: CPT

## 2021-10-12 PROCEDURE — 76830 TRANSVAGINAL US NON-OB: CPT

## 2021-10-12 PROCEDURE — 76856 US EXAM PELVIC COMPLETE: CPT

## 2021-10-12 PROCEDURE — G1004 CDSM NDSC: HCPCS

## 2021-10-12 RX ORDER — KETOROLAC TROMETHAMINE 30 MG/ML
15 INJECTION, SOLUTION INTRAMUSCULAR; INTRAVENOUS ONCE
Status: COMPLETED | OUTPATIENT
Start: 2021-10-12 | End: 2021-10-12

## 2021-10-12 RX ADMIN — KETOROLAC TROMETHAMINE 15 MG: 30 INJECTION, SOLUTION INTRAMUSCULAR; INTRAVENOUS at 10:24

## 2021-10-28 ENCOUNTER — CONSULT (OUTPATIENT)
Dept: GASTROENTEROLOGY | Facility: CLINIC | Age: 21
End: 2021-10-28
Payer: COMMERCIAL

## 2021-10-28 VITALS
OXYGEN SATURATION: 99 % | TEMPERATURE: 98.3 F | HEART RATE: 100 BPM | WEIGHT: 113 LBS | DIASTOLIC BLOOD PRESSURE: 60 MMHG | SYSTOLIC BLOOD PRESSURE: 110 MMHG | BODY MASS INDEX: 20.8 KG/M2 | HEIGHT: 62 IN

## 2021-10-28 DIAGNOSIS — R10.84 GENERALIZED ABDOMINAL PAIN: ICD-10-CM

## 2021-10-28 DIAGNOSIS — K59.04 CHRONIC IDIOPATHIC CONSTIPATION: ICD-10-CM

## 2021-10-28 DIAGNOSIS — R19.8 ABNORMAL BOWEL MOVEMENT: ICD-10-CM

## 2021-10-28 DIAGNOSIS — R10.13 DYSPEPSIA: Primary | ICD-10-CM

## 2021-10-28 PROCEDURE — 99204 OFFICE O/P NEW MOD 45 MIN: CPT | Performed by: INTERNAL MEDICINE

## 2021-10-28 PROCEDURE — 3008F BODY MASS INDEX DOCD: CPT | Performed by: INTERNAL MEDICINE

## 2021-10-28 RX ORDER — SPIRONOLACTONE 50 MG/1
TABLET, FILM COATED ORAL
COMMUNITY
Start: 2021-10-22 | End: 2022-04-08

## 2021-10-28 RX ORDER — NORETHINDRONE 0.35 MG/1
TABLET ORAL
COMMUNITY
Start: 2021-10-14

## 2021-10-28 RX ORDER — HYOSCYAMINE SULFATE 0.125 MG
TABLET ORAL
COMMUNITY
Start: 2021-10-14

## 2021-11-03 ENCOUNTER — TELEPHONE (OUTPATIENT)
Dept: OTHER | Facility: OTHER | Age: 21
End: 2021-11-03

## 2021-12-21 ENCOUNTER — TELEPHONE (OUTPATIENT)
Dept: OTHER | Facility: OTHER | Age: 21
End: 2021-12-21

## 2022-01-27 ENCOUNTER — TELEPHONE (OUTPATIENT)
Dept: GASTROENTEROLOGY | Facility: CLINIC | Age: 22
End: 2022-01-27

## 2022-01-27 NOTE — TELEPHONE ENCOUNTER
[facility] re scheduling a 3m F/U with Dr Abel Rodriguez or PA  Left message to call office and schedule a 3m F/U

## 2022-04-08 ENCOUNTER — OFFICE VISIT (OUTPATIENT)
Dept: FAMILY MEDICINE CLINIC | Facility: CLINIC | Age: 22
End: 2022-04-08
Payer: COMMERCIAL

## 2022-04-08 VITALS
WEIGHT: 119.2 LBS | DIASTOLIC BLOOD PRESSURE: 60 MMHG | HEART RATE: 72 BPM | BODY MASS INDEX: 21.94 KG/M2 | SYSTOLIC BLOOD PRESSURE: 90 MMHG | HEIGHT: 62 IN

## 2022-04-08 DIAGNOSIS — D68.51 FACTOR 5 LEIDEN MUTATION, HETEROZYGOUS (HCC): ICD-10-CM

## 2022-04-08 DIAGNOSIS — G43.909 MIGRAINE WITHOUT STATUS MIGRAINOSUS, NOT INTRACTABLE, UNSPECIFIED MIGRAINE TYPE: ICD-10-CM

## 2022-04-08 DIAGNOSIS — Z87.42 HX OF OVARIAN CYST: ICD-10-CM

## 2022-04-08 DIAGNOSIS — R10.2 PELVIC PAIN: Primary | ICD-10-CM

## 2022-04-08 LAB
SL AMB  POCT GLUCOSE, UA: NEGATIVE
SL AMB LEUKOCYTE ESTERASE,UA: NEGATIVE
SL AMB POCT BILIRUBIN,UA: NEGATIVE
SL AMB POCT BLOOD,UA: NEGATIVE
SL AMB POCT CLARITY,UA: CLEAR
SL AMB POCT COLOR,UA: YELLOW
SL AMB POCT KETONES,UA: NEGATIVE
SL AMB POCT NITRITE,UA: NEGATIVE
SL AMB POCT PH,UA: 7
SL AMB POCT SPECIFIC GRAVITY,UA: 1.02
SL AMB POCT URINE PROTEIN: NEGATIVE
SL AMB POCT UROBILINOGEN: 0.2

## 2022-04-08 PROCEDURE — 87086 URINE CULTURE/COLONY COUNT: CPT | Performed by: PHYSICIAN ASSISTANT

## 2022-04-08 PROCEDURE — 87147 CULTURE TYPE IMMUNOLOGIC: CPT | Performed by: PHYSICIAN ASSISTANT

## 2022-04-08 PROCEDURE — 3008F BODY MASS INDEX DOCD: CPT | Performed by: PHYSICIAN ASSISTANT

## 2022-04-08 PROCEDURE — 99214 OFFICE O/P EST MOD 30 MIN: CPT | Performed by: PHYSICIAN ASSISTANT

## 2022-04-08 PROCEDURE — 81002 URINALYSIS NONAUTO W/O SCOPE: CPT | Performed by: PHYSICIAN ASSISTANT

## 2022-04-08 PROCEDURE — 3725F SCREEN DEPRESSION PERFORMED: CPT | Performed by: PHYSICIAN ASSISTANT

## 2022-04-08 PROCEDURE — 1036F TOBACCO NON-USER: CPT | Performed by: PHYSICIAN ASSISTANT

## 2022-04-08 RX ORDER — TAZAROTENE 0.45 MG/G
LOTION TOPICAL
COMMUNITY
Start: 2022-02-07

## 2022-04-08 RX ORDER — DOXYCYCLINE HYCLATE 100 MG
TABLET ORAL
COMMUNITY
Start: 2022-04-01

## 2022-04-08 RX ORDER — CLINDAMYCIN PHOSPHATE AND BENZOYL PEROXIDE 10; 50 MG/G; MG/G
GEL TOPICAL
COMMUNITY
Start: 2022-02-07

## 2022-04-08 RX ORDER — TRIAMCINOLONE ACETONIDE 1 MG/G
CREAM TOPICAL
COMMUNITY
Start: 2022-04-01

## 2022-04-08 RX ORDER — SUMATRIPTAN 50 MG/1
TABLET, FILM COATED ORAL
Qty: 9 TABLET | Refills: 0 | Status: SHIPPED | OUTPATIENT
Start: 2022-04-08 | End: 2022-05-06

## 2022-04-08 RX ORDER — HYOSCYAMINE SULFATE 0.125 MG
TABLET ORAL
COMMUNITY
Start: 2022-02-22 | End: 2022-04-08 | Stop reason: SDUPTHER

## 2022-04-08 NOTE — ASSESSMENT & PLAN NOTE
Check non OB pelvic ultrasound with transvaginal to rule out 2nd ovarian cyst   Patient has had a discussion about endometriosis with gyn  Of course they do not want to look into this with surgery unless absolutely necessary  This pain is accompanied by nausea so blood work will be checked as well

## 2022-04-08 NOTE — PROGRESS NOTES
Assessment and Plan:    Problem List Items Addressed This Visit        Cardiovascular and Mediastinum    Migraine without status migrainosus, not intractable     Seems classic and Mother has hx of same  Check labs  Trial imetrx as directed starting with 50 milligrams 1 at onset repeat in 2 hours  This does not help for the 1st trial she may then use a total of 100 milligrams at onset and repeat in 2 hours with that next migraine after that  Relevant Medications    SUMAtriptan (Imitrex) 50 mg tablet    Other Relevant Orders    CBC and differential    Comprehensive metabolic panel    TSH, 3rd generation with Free T4 reflex       Hematopoietic and Hemostatic    Factor 5 Leiden mutation, heterozygous ([facility] )     Gyn change from estrogen containing birth control to progesterone only  Patient has had menses now for the past 3 weeks  Other    Pelvic pain - Primary     Check non OB pelvic ultrasound with transvaginal to rule out 2nd ovarian cyst   Patient has had a discussion about endometriosis with gyn  Of course they do not want to look into this with surgery unless absolutely necessary  This pain is accompanied by nausea so blood work will be checked as well  Relevant Orders    POCT urine dip (Completed)    UA w Reflex to Microscopic w Reflex to Culture - Clinic Collect    US pelvis complete non OB    Lipase    Amylase    Hx of ovarian cyst     Patient has a history of left ovarian cyst max of 1 3 centimeters complex in the fall of last year  Repeat ultrasound was not done for resolution check  Patient now having pains on the right side  Need to check ultrasound to rule out repeat ovarian cyst on contralateral side  Relevant Orders    US pelvis complete non OB                 Diagnoses and all orders for this visit:    Pelvic pain  -     POCT urine dip  -     UA w Reflex to Microscopic w Reflex to Culture - Clinic Collect  -     US pelvis complete non OB;  Future  -     Lipase; Future  -     Amylase; Future    Hx of ovarian cyst  -     US pelvis complete non OB; Future    Migraine without status migrainosus, not intractable, unspecified migraine type  -     CBC and differential  -     Comprehensive metabolic panel  -     TSH, 3rd generation with Free T4 reflex  -     SUMAtriptan (Imitrex) 50 mg tablet; Take 1 tab at headache onset and then repeat in two hours if needed  Factor 5 Leiden mutation, heterozygous ([address] )    Other orders  -     Discontinue: hyoscyamine (ANASPAZ,LEVSIN) 0 125 MG tablet; TAKE 1 TABLET BY MOUTH ONCE DAILY AT BEDTIME FOR DYSPEPSIA  -     doxycycline hyclate (VIBRA-TABS) 100 mg tablet  -     Clindamycin Phos-Benzoyl Perox gel; Apply 1 gm on the skin in the morning; to face  -     triamcinolone (KENALOG) 0 1 % cream  -     Arazlo 0 045 % LOTN; Apply 1 application on the skin nightly; to face              Subjective:      Patient ID: Yesi Hernandez is a 22 y o  female  CC:    Chief Complaint   Patient presents with    Abdominal Pain     Pt started with abdominal pain since 5 days ago, right lower side sharp pain    Breast Problem     bruise on bilateral side of breast    Headache     Migraine, on and off since december       HPI:    PT here today with 2 main concerns is accompanied by her mother  1  Patient has been having migraines since the end of last year having episodes now greater than 5 which include headache all over nausea lasting 2-3 hours light sensitivity and spots in her eyes without help of over-the-counter medication during these episodes  Mom has a history of migraines  2  Patient has a longstanding history of abdominal pain used to see GI pediatrics switched to Dr Lesia Burch an adult GI did not like him to not follow through  However patient has had lately and currently and episode of right sharp lower discomfort which happened in October with a workup of left ovarian cyst ultrasound was not repeated    At this point time and is now on her right   She was found to have factor 5 so birth control was switched to progesterone only she is currently had her menstrual cycle for the last 3 weeks  The following portions of the patient's history were reviewed and updated as appropriate: allergies, current medications, past family history, past medical history, past social history, past surgical history and problem list       Review of Systems   Constitutional: Negative  HENT: Negative  Eyes: Negative  Respiratory: Negative  Cardiovascular: Negative  Gastrointestinal: Negative  Endocrine: Negative  Genitourinary: Positive for menstrual problem and pelvic pain  Musculoskeletal: Negative  Skin: Negative  Allergic/Immunologic: Negative  Neurological: Positive for headaches  Hematological: Negative  Psychiatric/Behavioral: Negative  Data to review:       Objective:    Vitals:    04/08/22 1408   BP: 90/60   BP Location: Right arm   Patient Position: Sitting   Cuff Size: Standard   Pulse: 72   Weight: 54 1 kg (119 lb 3 2 oz)   Height: 5' 2" (1 575 m)        Physical Exam  Vitals and nursing note reviewed  Constitutional:       Appearance: Normal appearance  She is well-developed  HENT:      Head: Normocephalic and atraumatic  Eyes:      General: Lids are normal       Conjunctiva/sclera: Conjunctivae normal       Pupils: Pupils are equal, round, and reactive to light  Cardiovascular:      Rate and Rhythm: Normal rate and regular rhythm  Heart sounds: No murmur heard  Pulmonary:      Effort: Pulmonary effort is normal       Breath sounds: Normal breath sounds  Abdominal:      General: Abdomen is flat  Bowel sounds are normal       Palpations: Abdomen is soft  Tenderness: There is abdominal tenderness in the right lower quadrant  There is no right CVA tenderness, left CVA tenderness, guarding or rebound            Comments: Patient with discomfort in the right lower to right mid abdomen adjacent to umbilicus  Skin:     General: Skin is warm and dry  Neurological:      General: No focal deficit present  Mental Status: She is alert  Coordination: Coordination is intact  Psychiatric:         Mood and Affect: Mood normal          Behavior: Behavior normal  Behavior is cooperative  Thought Content: Thought content normal          Judgment: Judgment normal              Depression Screening and Follow-up Plan: Patient was screened for depression during today's encounter  They screened negative with a PHQ-2 score of 0

## 2022-04-08 NOTE — ASSESSMENT & PLAN NOTE
Seems classic and Mother has hx of same  Check labs  Trial imetrx as directed starting with 50 milligrams 1 at onset repeat in 2 hours  This does not help for the 1st trial she may then use a total of 100 milligrams at onset and repeat in 2 hours with that next migraine after that

## 2022-04-08 NOTE — PATIENT INSTRUCTIONS
Problem List Items Addressed This Visit        Cardiovascular and Mediastinum    Migraine without status migrainosus, not intractable     Seems classic and Mother has hx of same  Check labs  Trial imetrx as directed starting with 50 milligrams 1 at onset repeat in 2 hours  This does not help for the 1st trial she may then use a total of 100 milligrams at onset and repeat in 2 hours with that next migraine after that  Relevant Medications    SUMAtriptan (Imitrex) 50 mg tablet    Other Relevant Orders    CBC and differential    Comprehensive metabolic panel    TSH, 3rd generation with Free T4 reflex       Hematopoietic and Hemostatic    Factor 5 Leiden mutation, heterozygous ([facility] [address] )     Gyn change from estrogen containing birth control to progesterone only  Patient has had menses now for the past 3 weeks  Other    Pelvic pain - Primary     Check non OB pelvic ultrasound with transvaginal to rule out 2nd ovarian cyst   Patient has had a discussion about endometriosis with gyn  Of course they do not want to look into this with surgery unless absolutely necessary  This pain is accompanied by nausea so blood work will be checked as well  Relevant Orders    POCT urine dip (Completed)    UA w Reflex to Microscopic w Reflex to Culture - Clinic Collect    US pelvis complete non OB    Lipase    Amylase    Hx of ovarian cyst     Patient has a history of left ovarian cyst max of 1 3 centimeters complex in the fall of last year  Repeat ultrasound was not done for resolution check  Patient now having pains on the right side  Need to check ultrasound to rule out repeat ovarian cyst on contralateral side           Relevant Orders    US pelvis complete non OB

## 2022-04-08 NOTE — ASSESSMENT & PLAN NOTE
Patient has a history of left ovarian cyst max of 1 3 centimeters complex in the fall of last year  Repeat ultrasound was not done for resolution check  Patient now having pains on the right side  Need to check ultrasound to rule out repeat ovarian cyst on contralateral side

## 2022-04-08 NOTE — ASSESSMENT & PLAN NOTE
Gyn change from estrogen containing birth control to progesterone only  Patient has had menses now for the past 3 weeks

## 2022-04-10 LAB
BACTERIA UR CULT: ABNORMAL
BACTERIA UR CULT: ABNORMAL

## 2022-04-11 NOTE — RESULT ENCOUNTER NOTE
Please let pt know she has a very small growth of bacteria in her culture which do to the fact she was c/o pelvic pain I will treat  Amoxil was sent to pt pharmacy  I am still waiting on lab results

## 2022-04-14 ENCOUNTER — HOSPITAL ENCOUNTER (OUTPATIENT)
Dept: ULTRASOUND IMAGING | Facility: HOSPITAL | Age: 22
Discharge: HOME | End: 2022-04-14
Payer: COMMERCIAL

## 2022-04-14 DIAGNOSIS — Z87.42 HX OF OVARIAN CYST: ICD-10-CM

## 2022-04-14 DIAGNOSIS — R10.2 PELVIC PAIN: ICD-10-CM

## 2022-04-14 PROCEDURE — 76830 TRANSVAGINAL US NON-OB: CPT

## 2022-04-14 PROCEDURE — 76856 US EXAM PELVIC COMPLETE: CPT

## 2022-04-20 NOTE — RESULT ENCOUNTER NOTE
Please let pt know that her US pelvis was normal  No new ovarian cysts and previous had resolved  I am still waiting for lab results

## 2022-04-22 ENCOUNTER — APPOINTMENT (OUTPATIENT)
Dept: LAB | Facility: CLINIC | Age: 22
End: 2022-04-22
Payer: COMMERCIAL

## 2022-04-22 DIAGNOSIS — R10.2 PELVIC PAIN: ICD-10-CM

## 2022-04-22 LAB
ALBUMIN SERPL BCP-MCNC: 3.9 G/DL (ref 3.5–5)
ALP SERPL-CCNC: 74 U/L (ref 46–116)
ALT SERPL W P-5'-P-CCNC: 22 U/L (ref 12–78)
AMYLASE SERPL-CCNC: 87 IU/L (ref 25–115)
ANION GAP SERPL CALCULATED.3IONS-SCNC: 5 MMOL/L (ref 4–13)
AST SERPL W P-5'-P-CCNC: 19 U/L (ref 5–45)
BASOPHILS # BLD AUTO: 0.04 THOUSANDS/ΜL (ref 0–0.1)
BASOPHILS NFR BLD AUTO: 1 % (ref 0–1)
BILIRUB SERPL-MCNC: 0.99 MG/DL (ref 0.2–1)
BUN SERPL-MCNC: 22 MG/DL (ref 5–25)
CALCIUM SERPL-MCNC: 9.4 MG/DL (ref 8.3–10.1)
CHLORIDE SERPL-SCNC: 109 MMOL/L (ref 100–108)
CO2 SERPL-SCNC: 26 MMOL/L (ref 21–32)
CREAT SERPL-MCNC: 0.67 MG/DL (ref 0.6–1.3)
EOSINOPHIL # BLD AUTO: 0.05 THOUSAND/ΜL (ref 0–0.61)
EOSINOPHIL NFR BLD AUTO: 1 % (ref 0–6)
ERYTHROCYTE [DISTWIDTH] IN BLOOD BY AUTOMATED COUNT: 12.7 % (ref 11.6–15.1)
GFR SERPL CREATININE-BSD FRML MDRD: 126 ML/MIN/1.73SQ M
GLUCOSE P FAST SERPL-MCNC: 79 MG/DL (ref 65–99)
HCT VFR BLD AUTO: 41.1 % (ref 34.8–46.1)
HGB BLD-MCNC: 13.3 G/DL (ref 11.5–15.4)
IMM GRANULOCYTES # BLD AUTO: 0.02 THOUSAND/UL (ref 0–0.2)
IMM GRANULOCYTES NFR BLD AUTO: 0 % (ref 0–2)
LIPASE SERPL-CCNC: 79 U/L (ref 73–393)
LYMPHOCYTES # BLD AUTO: 1.64 THOUSANDS/ΜL (ref 0.6–4.47)
LYMPHOCYTES NFR BLD AUTO: 35 % (ref 14–44)
MCH RBC QN AUTO: 30 PG (ref 26.8–34.3)
MCHC RBC AUTO-ENTMCNC: 32.4 G/DL (ref 31.4–37.4)
MCV RBC AUTO: 93 FL (ref 82–98)
MONOCYTES # BLD AUTO: 0.3 THOUSAND/ΜL (ref 0.17–1.22)
MONOCYTES NFR BLD AUTO: 6 % (ref 4–12)
NEUTROPHILS # BLD AUTO: 2.68 THOUSANDS/ΜL (ref 1.85–7.62)
NEUTS SEG NFR BLD AUTO: 57 % (ref 43–75)
NRBC BLD AUTO-RTO: 0 /100 WBCS
PLATELET # BLD AUTO: 237 THOUSANDS/UL (ref 149–390)
PMV BLD AUTO: 10.7 FL (ref 8.9–12.7)
POTASSIUM SERPL-SCNC: 4.1 MMOL/L (ref 3.5–5.3)
PROT SERPL-MCNC: 6.9 G/DL (ref 6.4–8.2)
RBC # BLD AUTO: 4.44 MILLION/UL (ref 3.81–5.12)
SODIUM SERPL-SCNC: 140 MMOL/L (ref 136–145)
TSH SERPL DL<=0.05 MIU/L-ACNC: 0.86 UIU/ML (ref 0.45–4.5)
WBC # BLD AUTO: 4.73 THOUSAND/UL (ref 4.31–10.16)

## 2022-04-22 PROCEDURE — 83690 ASSAY OF LIPASE: CPT

## 2022-04-22 PROCEDURE — 80053 COMPREHEN METABOLIC PANEL: CPT | Performed by: PHYSICIAN ASSISTANT

## 2022-04-22 PROCEDURE — 82150 ASSAY OF AMYLASE: CPT

## 2022-04-22 PROCEDURE — 84443 ASSAY THYROID STIM HORMONE: CPT | Performed by: PHYSICIAN ASSISTANT

## 2022-04-22 PROCEDURE — 36415 COLL VENOUS BLD VENIPUNCTURE: CPT | Performed by: PHYSICIAN ASSISTANT

## 2022-04-22 PROCEDURE — 85025 COMPLETE CBC W/AUTO DIFF WBC: CPT | Performed by: PHYSICIAN ASSISTANT

## 2022-05-06 DIAGNOSIS — G43.909 MIGRAINE WITHOUT STATUS MIGRAINOSUS, NOT INTRACTABLE, UNSPECIFIED MIGRAINE TYPE: ICD-10-CM

## 2022-05-06 RX ORDER — SUMATRIPTAN 50 MG/1
TABLET, FILM COATED ORAL
Qty: 9 TABLET | Refills: 0 | Status: SHIPPED | OUTPATIENT
Start: 2022-05-06

## 2022-05-06 NOTE — TELEPHONE ENCOUNTER
Requested Prescriptions     Pending Prescriptions Disp Refills    SUMAtriptan (IMITREX) 50 mg tablet [Pharmacy Med Name: SUMATRIPTAN SUCC 50 MG TABLET] 9 tablet 0     Sig: TAKE 1 TAB AT HEADACHE ONSET AND THEN REPEAT IN TWO HOURS IF NEEDED       LOV 4/8/22, F/U non scheduled, labs completed

## 2022-08-01 ENCOUNTER — OFFICE VISIT (OUTPATIENT)
Dept: URGENT CARE | Facility: CLINIC | Age: 22
End: 2022-08-01
Payer: COMMERCIAL

## 2022-08-01 VITALS
WEIGHT: 120 LBS | TEMPERATURE: 99.2 F | RESPIRATION RATE: 16 BRPM | BODY MASS INDEX: 22.66 KG/M2 | HEART RATE: 103 BPM | HEIGHT: 61 IN | OXYGEN SATURATION: 99 %

## 2022-08-01 DIAGNOSIS — J02.9 SORE THROAT: ICD-10-CM

## 2022-08-01 DIAGNOSIS — B34.9 VIRAL SYNDROME: ICD-10-CM

## 2022-08-01 DIAGNOSIS — J02.9 ACUTE PHARYNGITIS, UNSPECIFIED ETIOLOGY: Primary | ICD-10-CM

## 2022-08-01 PROCEDURE — U0005 INFEC AGEN DETEC AMPLI PROBE: HCPCS | Performed by: PHYSICIAN ASSISTANT

## 2022-08-01 PROCEDURE — 87070 CULTURE OTHR SPECIMN AEROBIC: CPT | Performed by: PHYSICIAN ASSISTANT

## 2022-08-01 PROCEDURE — 99213 OFFICE O/P EST LOW 20 MIN: CPT | Performed by: PHYSICIAN ASSISTANT

## 2022-08-01 PROCEDURE — U0003 INFECTIOUS AGENT DETECTION BY NUCLEIC ACID (DNA OR RNA); SEVERE ACUTE RESPIRATORY SYNDROME CORONAVIRUS 2 (SARS-COV-2) (CORONAVIRUS DISEASE [COVID-19]), AMPLIFIED PROBE TECHNIQUE, MAKING USE OF HIGH THROUGHPUT TECHNOLOGIES AS DESCRIBED BY CMS-2020-01-R: HCPCS | Performed by: PHYSICIAN ASSISTANT

## 2022-08-01 PROCEDURE — 87880 STREP A ASSAY W/OPTIC: CPT | Performed by: PHYSICIAN ASSISTANT

## 2022-08-01 NOTE — PATIENT INSTRUCTIONS
Viral Syndrome   AMBULATORY CARE:   Viral syndrome  is a term used for symptoms of an infection caused by a virus  Viruses are spread easily from person to person through the air and on shared items  Signs and symptoms  may start slowly or suddenly and last hours to days  They can be mild to severe and can change over days or hours  You may have any of the following:  Fever and chills    A runny or stuffy nose    Cough, sore throat, or hoarseness    Headache, or pain and pressure around your eyes    Muscle aches and joint pain    Shortness of breath or wheezing    Abdominal pain, cramps, and diarrhea    Nausea, vomiting, or loss of appetite    Call your local emergency number (911 in the [address] [facility]) or have someone else call if:   You have a seizure  You cannot be woken  You have chest pain or trouble breathing  Seek care immediately if:   You have a stiff neck, a bad headache, and sensitivity to light  You feel weak, dizzy, or confused  You stop urinating or urinate a lot less than usual     You cough up blood or thick yellow or green mucus  You have severe abdominal pain or your abdomen is larger than usual     Call your doctor if:   Your symptoms do not get better with treatment or get worse after 3 days  You have a rash or ear pain  You have burning when you urinate  You have questions or concerns about your condition or care  Treatment for viral syndrome  may include medicines to manage your symptoms  Antibiotics are not given for a viral infection  You may  need any of the following:  Acetaminophen  decreases pain and fever  It is available without a doctor's order  Ask how much to take and how often to take it  Follow directions  Read the labels of all other medicines you are using to see if they also contain acetaminophen, or ask your doctor or pharmacist  Acetaminophen can cause liver damage if not taken correctly   Do not use more than 4 grams (4,000 milligrams) total of acetaminophen in one day  NSAIDs , such as ibuprofen, help decrease swelling, pain, and fever  NSAIDs can cause stomach bleeding or kidney problems in certain people  If you take blood thinner medicine, always ask your healthcare provider if NSAIDs are safe for you  Always read the medicine label and follow directions  Cold medicine  helps decrease swelling, control a cough, and relieve chest or nasal congestion  Saline nasal spray  helps decrease nasal congestion  Manage your symptoms:   Drink liquids as directed to prevent dehydration  Ask how much liquid to drink each day and which liquids are best for you  Ask if you should drink an oral rehydration solution (ORS)  An ORS has the right amounts of water, salts, and sugar you need to replace body fluids  This may help prevent dehydration caused by vomiting or diarrhea  Do not drink liquids with caffeine  Liquids with caffeine can make dehydration worse  Get plenty of rest to help your body heal   Take naps throughout the day  Ask your healthcare provider when you can return to work and your normal activities  Use a cool mist humidifier to help you breathe easier  Ask your healthcare provider how to use a cool mist humidifier  Eat honey or use cough drops for a sore throat  Cough drops are available without a doctor's order  Follow directions for taking cough drops  Do not smoke or be close to anyone who is smoking  Nicotine and other chemicals in cigarettes and cigars can cause lung damage  Smoking can also delay healing  Ask your healthcare provider for information if you currently smoke and need help to quit  E-cigarettes or smokeless tobacco still contain nicotine  Talk to your healthcare provider before you use these products  Prevent the spread of germs:       Wash your hands often  Wash your hands several times each day  Wash after you use the bathroom, change a child's diaper, and before you prepare or eat food   Use soap and water every time  Rub your soapy hands together, lacing your fingers  Wash the front and back of your hands, and in between your fingers  Use the fingers of one hand to scrub under the fingernails of the other hand  Wash for at least 20 seconds  Rinse with warm, running water for several seconds  Then dry your hands with a clean towel or paper towel  Use hand  that contains alcohol if soap and water are not available  Do not touch your eyes, nose, or mouth without washing your hands first          Cover a sneeze or cough  Use a tissue that covers your mouth and nose  Throw the tissue away in a trash can right away  Use the bend of your arm if a tissue is not available  Wash your hands well with soap and water or use a hand   Stay away from others while you are sick  Avoid crowds as much as possible  Ask about vaccines you may need  Talk to your healthcare provider about your vaccine history  He or she will tell you which vaccines you need, and when to get them  Get the influenza (flu) vaccine as soon as recommended each year  The flu vaccine is available starting in September or October  Flu viruses change, so it is important to get a flu vaccine every year  Get the pneumonia vaccine if recommended  This vaccine is usually recommended every 5 years  Your provider will tell you when to get this vaccine, if needed  Follow up with your doctor as directed:  Write down your questions so you remember to ask them during your visits  © Copyright [employer] 2022 Information is for End User's use only and may not be sold, redistributed or otherwise used for commercial purposes  All illustrations and images included in CareNotes® are the copyrighted property of A D A M [employer] Inc  or [employer]  The above information is an  only  It is not intended as medical advice for individual conditions or treatments   Talk to your doctor, nurse or pharmacist before following any medical regimen to see if it is safe and effective for you  Pharyngitis   AMBULATORY CARE:   Pharyngitis , or sore throat, is inflammation of the tissues and structures in your pharynx (throat)  Pharyngitis is most often caused by bacteria  It may also be caused by a cold or flu virus  Other causes include smoking, allergies, or acid reflux  Signs and symptoms that may occur with pharyngitis:   Sore throat or pain when you swallow    Fever, chills, and body aches    Hoarse or raspy voice    Cough, runny or stuffy nose, itchy or watery eyes    Headache    Upset stomach and loss of appetite    Mild neck stiffness    Swollen glands that feel like hard lumps when you touch your neck    White and yellow pus-filled blisters in the back of your throat    Call 911 for any of the following: You have trouble breathing or swallowing because your throat is swollen or sore  Seek care immediately if:   You are drooling because it hurts too much to swallow  Your fever is higher than 102? F (39?C) or lasts longer than 3 days  You are confused  You taste blood in your throat  Contact your healthcare provider if:   Your throat pain gets worse  You have a painful lump in your throat that does not go away after 5 days  Your symptoms do not improve after 5 days  You have questions or concerns about your condition or care  Treatment for pharyngitis:  Viral pharyngitis will go away on its own without treatment  Your sore throat should start to feel better in 3 to 5 days for both viral and bacterial infections  You may need any of the following:  Antibiotics  treat a bacterial infection  NSAIDs , such as ibuprofen, help decrease swelling, pain, and fever  NSAIDs can cause stomach bleeding or kidney problems in certain people  If you take blood thinner medicine, always ask your healthcare provider if NSAIDs are safe for you  Always read the medicine label and follow directions      Acetaminophen  decreases pain and fever  It is available without a doctor's order  Ask how much to take and how often to take it  Follow directions  Acetaminophen can cause liver damage if not taken correctly  Manage your symptoms:   Gargle salt water  Mix ¼ teaspoon salt in an 8 ounce glass of warm water and gargle  This may help decrease swelling in your throat  Drink liquids as directed  You may need to drink more liquids than usual  Liquids may help soothe your throat and prevent dehydration  Ask how much liquid to drink each day and which liquids are best for you  Use a cool-steam humidifier  to help moisten the air in your room and calm your cough  Soothe your throat  with cough drops, ice, soft foods, or popsicles  Prevent the spread of pharyngitis:  Cover your mouth and nose when you cough or sneeze  Do not share food or drinks  Wash your hands often  Use soap and water  If soap and water are unavailable, use an alcohol based hand   Follow up with your doctor as directed:  Write down your questions so you remember to ask them during your visits  © Copyright [employer] 2022 Information is for End User's use only and may not be sold, redistributed or otherwise used for commercial purposes  All illustrations and images included in CareNotes® are the copyrighted property of [employer] A M , Inc  or [employer]umi Blunt  The above information is an  only  It is not intended as medical advice for individual conditions or treatments  Talk to your doctor, nurse or pharmacist before following any medical regimen to see if it is safe and effective for you

## 2022-08-01 NOTE — PROGRESS NOTES
[facility]        NAME: Thomas Lema is a 22 y o  female  : 2000    MRN: [id number]  DATE: August 15, 2022  TIME: 2:18 AM    Assessment and Plan   Acute pharyngitis, unspecified etiology [J02 9]  1  Acute pharyngitis, unspecified etiology  al mag oxide-diphenhydramine-lidocaine viscous (MAGIC MOUTHWASH) 1:1:1 suspension   2  Viral syndrome     3  Sore throat  POCT rapid strepA    Throat culture    COVID Only - Collected at [place] [address] or [facility]    COVID Only - Collected at [place] [address] or [facility]         Patient Instructions       Follow up with PCP in 3-5 days  Proceed to  ER if symptoms worsen  Chief Complaint     Chief Complaint   Patient presents with    Sore Throat     Pt reports sore throat, headache and body aches with sinus congestion for two days  Reports fever yesterday  Negative at home Covid test yesterday  History of Present Illness       21-year-old female presents to the clinic with sore throat, headache, body aches and sinus congestion for 2 days  Patient states she had a fever yesterday  Patient took a COVID test yesterday which was negative  Patient is vaccinated for COVID-19  Review of Systems   Review of Systems   Constitutional: Positive for fever  Negative for chills  HENT: Positive for congestion, rhinorrhea and sore throat  Respiratory: Positive for cough  Gastrointestinal: Negative for abdominal pain, diarrhea, nausea and vomiting  Musculoskeletal: Positive for myalgias  Neurological: Positive for headaches  Negative for dizziness and light-headedness           Current Medications       Current Outpatient Medications:     al mag oxide-diphenhydramine-lidocaine viscous (MAGIC MOUTHWASH) 1:1:1 suspension, Swish and spit 10 mL every 4 (four) hours as needed for mouth pain or discomfort, Disp: 90 mL, Rfl: 0    Adapalene 0 3 % gel, PLEASE SEE ATTACHED FOR DETAILED DIRECTIONS (Patient not taking: Reported on 2022), Disp: , Rfl:    Arazlo 0 045 % LOTN, Apply 1 application on the skin nightly; to face (Patient not taking: Reported on 8/1/2022), Disp: , Rfl:     Clindamycin Phos-Benzoyl Perox gel, Apply 1 gm on the skin in the morning; to face (Patient not taking: Reported on 8/1/2022), Disp: , Rfl:     clotrimazole-betamethasone (LOTRISONE) 1-0 05 % cream, Apply topically 2 (two) times a day (Patient not taking: No sig reported), Disp: 30 g, Rfl: 0    doxycycline hyclate (VIBRA-TABS) 100 mg tablet, , Disp: , Rfl:     hyoscyamine (ANASPAZ,LEVSIN) 0 125 MG tablet, , Disp: , Rfl:     Incassia 0 35 MG tablet, , Disp: , Rfl:     linaCLOtide 290 MCG CAPS, Take 1 capsule by mouth daily (Patient not taking: No sig reported), Disp: 30 capsule, Rfl: 7    SUMAtriptan (IMITREX) 50 mg tablet, TAKE 1 TAB AT HEADACHE ONSET AND THEN REPEAT IN TWO HOURS IF NEEDED  (Patient not taking: Reported on 8/1/2022), Disp: 9 tablet, Rfl: 0    tretinoin (RETIN-A) 0 025 % cream, , Disp: , Rfl:     triamcinolone (KENALOG) 0 1 % cream, , Disp: , Rfl:     Current Allergies     Allergies as of 08/01/2022    (No Known Allergies)            The following portions of the patient's history were reviewed and updated as appropriate: allergies, current medications, past family history, past medical history, past social history, past surgical history and problem list      Past Medical History:   Diagnosis Date    Factor V Leiden ([facility] [address] )     Migraines        Past Surgical History:   Procedure Laterality Date    ADENOIDECTOMY      BREAST LUMPECTOMY Right     ESOPHAGOGASTRODUODENOSCOPY N/A 01/08/2019    Procedure: ESOPHAGOGASTRODUODENOSCOPY (EGD); Surgeon: Erick Mercado MD;  Location: [facility];   Service: Pediatric Gastrointestinal    TONSILLECTOMY         Family History   Problem Relation Age of Onset    Arthritis Mother     Alcohol abuse Father     Bleeding Disorder Father     Heart disease Father     Breast cancer Maternal Grandmother     Kidney disease Maternal Uncle          Medications have been verified  Objective   Pulse 103   Temp 99 2 °F (37 3 °C)   Resp 16   Ht 5' 1" (1 549 m)   Wt 54 4 kg (120 lb)   SpO2 99%   BMI 22 67 kg/m²   No LMP recorded  Physical Exam     Physical Exam  Vitals and nursing note reviewed  Constitutional:       General: She is not in acute distress  Appearance: She is well-developed  She is not diaphoretic  HENT:      Head: Normocephalic and atraumatic  Right Ear: Tympanic membrane normal       Left Ear: Tympanic membrane normal       Nose: Mucosal edema, congestion and rhinorrhea present  Mouth/Throat:      Pharynx: Uvula midline  Posterior oropharyngeal erythema (PND) present  Cardiovascular:      Rate and Rhythm: Normal rate and regular rhythm  Pulmonary:      Effort: Pulmonary effort is normal       Breath sounds: Normal breath sounds  Musculoskeletal:         General: Normal range of motion  Skin:     General: Skin is warm and dry  Findings: No rash  Neurological:      Mental Status: She is alert and oriented to person, place, and time

## 2022-08-02 LAB — SARS-COV-2 RNA RESP QL NAA+PROBE: NEGATIVE

## 2022-08-03 LAB — BACTERIA THROAT CULT: NORMAL

## 2022-08-15 LAB — S PYO AG THROAT QL: NEGATIVE

## 2022-12-28 ENCOUNTER — OFFICE VISIT (OUTPATIENT)
Dept: FAMILY MEDICINE CLINIC | Facility: CLINIC | Age: 22
End: 2022-12-28

## 2022-12-28 VITALS
HEIGHT: 61 IN | DIASTOLIC BLOOD PRESSURE: 64 MMHG | WEIGHT: 122.38 LBS | HEART RATE: 118 BPM | SYSTOLIC BLOOD PRESSURE: 108 MMHG | BODY MASS INDEX: 23.11 KG/M2 | TEMPERATURE: 98.6 F | OXYGEN SATURATION: 98 %

## 2022-12-28 DIAGNOSIS — Z20.822 SUSPECTED COVID-19 VIRUS INFECTION: ICD-10-CM

## 2022-12-28 DIAGNOSIS — H69.83 DYSFUNCTION OF BOTH EUSTACHIAN TUBES: ICD-10-CM

## 2022-12-28 DIAGNOSIS — Z3A.08 8 WEEKS GESTATION OF PREGNANCY: ICD-10-CM

## 2022-12-28 DIAGNOSIS — H65.02 NON-RECURRENT ACUTE SEROUS OTITIS MEDIA OF LEFT EAR: Primary | ICD-10-CM

## 2022-12-28 LAB
SARS-COV-2 AG UPPER RESP QL IA: NEGATIVE
VALID CONTROL: NORMAL

## 2022-12-28 RX ORDER — FLUTICASONE PROPIONATE 50 MCG
1 SPRAY, SUSPENSION (ML) NASAL DAILY
Qty: 11.1 ML | Refills: 3 | Status: SHIPPED | OUTPATIENT
Start: 2022-12-28

## 2022-12-28 RX ORDER — AMOXICILLIN 500 MG/1
500 CAPSULE ORAL EVERY 8 HOURS SCHEDULED
Qty: 21 CAPSULE | Refills: 0 | Status: SHIPPED | OUTPATIENT
Start: 2022-12-28 | End: 2023-01-04

## 2022-12-28 NOTE — PROGRESS NOTES
Name: Carlos Gutierrez      : 2000      MRN: [id number]  Encounter Provider: RAYSA Shah  Encounter Date: 2022   Encounter department: [place] [address]  Non-recurrent acute serous otitis media of left ear  Assessment & Plan:  7-day course of amoxicillin was ordered to treat acute otitis media  Flonase was also ordered to be used daily to help with bilateral ETD  Patient was advised to take amoxicillin with yogurt and a probiotic if possible to help with GI upset  Orders:  -     amoxicillin (AMOXIL) 500 mg capsule; Take 1 capsule (500 mg total) by mouth every 8 (eight) hours for 7 days  -     fluticasone (FLONASE) 50 mcg/act nasal spray; 1 spray into each nostril daily    2  Suspected COVID-19 virus infection  -     POCT Rapid Covid Ag    3  Dysfunction of both eustachian tubes  -     fluticasone (FLONASE) 50 mcg/act nasal spray; 1 spray into each nostril daily    4  8 weeks gestation of pregnancy  Assessment & Plan:  Patient is currently following with OB/GYN for this  Subjective      Bilateral otalgia: Patient reports over the past 2 days she has been having bilateral otalgia, loss of hearing, non-productive cough, and she does report some pain by her ear when chewing  She denies any fever, chills, or shortness of breath  Patient does report some nausea but she is currently 8 weeks pregnant and has been having intermittent bouts of nausea related to this  Patient is vaccinated for COVID but has not completed a home COVID test since her symptoms started  Rapid COVID test performed in the office today was negative  Review of Systems   Constitutional: Negative for chills and fever  HENT: Positive for congestion, ear pain (bilateral-L>R), hearing loss (bilateral) and rhinorrhea  Negative for postnasal drip, sinus pressure, sinus pain and sore throat  Eyes: Negative for pain and visual disturbance     Respiratory: Positive for cough (non-productive )  Negative for shortness of breath  Cardiovascular: Negative for chest pain and palpitations  Gastrointestinal: Positive for nausea (due to pregnancy)  Negative for abdominal pain, constipation, diarrhea and vomiting  Endocrine: Negative for cold intolerance and heat intolerance  Genitourinary: Negative for decreased urine volume, dysuria and hematuria  Musculoskeletal: Negative for arthralgias, back pain and myalgias  Skin: Negative for color change and rash  Allergic/Immunologic: Negative for environmental allergies  Neurological: Negative for dizziness, seizures, syncope, weakness, light-headedness, numbness and headaches  Hematological: Negative for adenopathy  Psychiatric/Behavioral: Negative for confusion  The patient is not nervous/anxious  All other systems reviewed and are negative        Current Outpatient Medications on File Prior to Visit   Medication Sig   • [DISCONTINUED] Adapalene 0 3 % gel PLEASE SEE ATTACHED FOR DETAILED DIRECTIONS (Patient not taking: Reported on 8/1/2022)   • [DISCONTINUED] al mag oxide-diphenhydramine-lidocaine viscous (MAGIC MOUTHWASH) 1:1:1 suspension Swish and spit 10 mL every 4 (four) hours as needed for mouth pain or discomfort (Patient not taking: Reported on 12/28/2022)   • [DISCONTINUED] Arazlo 0 045 % LOTN Apply 1 application on the skin nightly; to face (Patient not taking: Reported on 8/1/2022)   • [DISCONTINUED] Clindamycin Phos-Benzoyl Perox gel Apply 1 gm on the skin in the morning; to face (Patient not taking: Reported on 8/1/2022)   • [DISCONTINUED] clotrimazole-betamethasone (LOTRISONE) 1-0 05 % cream Apply topically 2 (two) times a day (Patient not taking: Reported on 8/17/2021)   • [DISCONTINUED] doxycycline hyclate (VIBRA-TABS) 100 mg tablet  (Patient not taking: Reported on 8/1/2022)   • [DISCONTINUED] hyoscyamine (ANASPAZ,LEVSIN) 0 125 MG tablet  (Patient not taking: Reported on 8/1/2022)   • [DISCONTINUED] Incassia 0 35 MG tablet  (Patient not taking: Reported on 8/1/2022)   • [DISCONTINUED] linaCLOtide 290 MCG CAPS Take 1 capsule by mouth daily (Patient not taking: Reported on 4/8/2022)   • [DISCONTINUED] SUMAtriptan (IMITREX) 50 mg tablet TAKE 1 TAB AT HEADACHE ONSET AND THEN REPEAT IN TWO HOURS IF NEEDED  (Patient not taking: Reported on 8/1/2022)   • [DISCONTINUED] tretinoin (RETIN-A) 0 025 % cream  (Patient not taking: Reported on 8/1/2022)   • [DISCONTINUED] triamcinolone (KENALOG) 0 1 % cream  (Patient not taking: Reported on 8/1/2022)       Objective     /64 (BP Location: Left arm, Patient Position: Sitting, Cuff Size: Standard)   Pulse (!) 118   Temp 98 6 °F (37 °C) (Temporal)   Ht 5' 1" (1 549 m)   Wt 55 5 kg (122 lb 6 oz)   LMP 10/21/2022   SpO2 98%   BMI 23 12 kg/m²     Physical Exam  Vitals and nursing note reviewed  Constitutional:       General: She is not in acute distress  Appearance: Normal appearance  She is not ill-appearing  HENT:      Head: Normocephalic  Right Ear: Decreased hearing noted  A middle ear effusion (moderate) is present  Left Ear: Decreased hearing noted  A middle ear effusion (large) is present  Tympanic membrane is injected and bulging  Eyes:      Conjunctiva/sclera: Conjunctivae normal    Cardiovascular:      Rate and Rhythm: Normal rate and regular rhythm  Pulses: Normal pulses  Carotid pulses are 2+ on the right side and 2+ on the left side  Radial pulses are 2+ on the right side and 2+ on the left side  Posterior tibial pulses are 2+ on the right side and 2+ on the left side  Heart sounds: Normal heart sounds  No murmur heard  Pulmonary:      Effort: Pulmonary effort is normal  No respiratory distress  Breath sounds: Normal breath sounds  No wheezing or rhonchi  Abdominal:      General: Abdomen is flat  Bowel sounds are normal  There is no distension  Palpations: Abdomen is soft        Tenderness: There is no abdominal tenderness  There is no guarding  Musculoskeletal:         General: Normal range of motion  Cervical back: Normal range of motion  Right lower leg: No edema  Left lower leg: No edema  Skin:     General: Skin is warm and dry  Capillary Refill: Capillary refill takes less than 2 seconds  Neurological:      General: No focal deficit present  Mental Status: She is alert and oriented to person, place, and time  Psychiatric:         Mood and Affect: Mood normal          Behavior: Behavior normal          Thought Content:  Thought content normal          Judgment: Judgment normal        RAYSA Fairchild

## 2022-12-28 NOTE — ASSESSMENT & PLAN NOTE
7-day course of amoxicillin was ordered to treat acute otitis media  Flonase was also ordered to be used daily to help with bilateral ETD  Patient was advised to take amoxicillin with yogurt and a probiotic if possible to help with GI upset

## 2022-12-30 LAB
EXTERNAL CHLAMYDIA RESULT: NEGATIVE
EXTERNAL HIV SCREEN: NORMAL
HCV AB SER-ACNC: <0.1
N GONORRHOEA RRNA SPEC QL PROBE: NEGATIVE

## 2023-01-05 ENCOUNTER — CLINICAL SUPPORT (OUTPATIENT)
Dept: FAMILY MEDICINE CLINIC | Facility: CLINIC | Age: 23
End: 2023-01-05

## 2023-01-05 DIAGNOSIS — Z23 NEED FOR INFLUENZA VACCINATION: Primary | ICD-10-CM

## 2023-01-18 ENCOUNTER — ROUTINE PRENATAL (OUTPATIENT)
Dept: PERINATAL CARE | Facility: OTHER | Age: 23
End: 2023-01-18

## 2023-01-18 VITALS
HEART RATE: 98 BPM | WEIGHT: 122 LBS | SYSTOLIC BLOOD PRESSURE: 100 MMHG | HEIGHT: 61 IN | DIASTOLIC BLOOD PRESSURE: 62 MMHG | BODY MASS INDEX: 23.03 KG/M2

## 2023-01-18 DIAGNOSIS — Z3A.12 12 WEEKS GESTATION OF PREGNANCY: ICD-10-CM

## 2023-01-18 DIAGNOSIS — D68.51 FACTOR 5 LEIDEN MUTATION, HETEROZYGOUS (HCC): Primary | ICD-10-CM

## 2023-01-18 DIAGNOSIS — O36.80X0 ENCOUNTER TO DETERMINE FETAL VIABILITY OF PREGNANCY, SINGLE OR UNSPECIFIED FETUS: ICD-10-CM

## 2023-01-18 DIAGNOSIS — Z36.82 ENCOUNTER FOR NUCHAL TRANSLUCENCY TESTING: ICD-10-CM

## 2023-01-18 DIAGNOSIS — O09.899 SUPERVISION OF OTHER HIGH RISK PREGNANCIES, UNSPECIFIED TRIMESTER: ICD-10-CM

## 2023-01-18 NOTE — PROGRESS NOTES
Cheyenne Montes presents today for a maternal-fetal medicine evaluation and nuchal translucency ultrasound  She recently had noninvasive prenatal screening drawn through her doctor's office and results are pending  Cheyenne Montes is known to have factor V Leiden heterozygote drawn because her father had this condition  She has never had a venous thromboembolism  She has a history of a lumpectomy, wisdom teeth extraction, and tonsillectomy  She currently takes prenatal vitamins and she has no known drug allergies  Her substance use history is unremarkable  Family history is significant for her father who had blood clots and was found to be factor V Leiden heterozygote  There is a maternal history of preeclampsia  Her mother, who accompanied her to the visit today, had preeclampsia with both Cheyenne Montes and Maria Dolores's brother  Maria Dolores's brother and father also have a history of opiate use and alcohol use disorder, respectively  A review of systems is otherwise negative  I reviewed with Cheyenne Montes her history of factor V Leiden heterozygote  This is a low risk thrombophilia  She has never had a venous thromboembolism  In general, antepartum anticoagulation is not indicated and postpartum anticoagulation is only indicated if Cheyenne Montes undergoes  section  I recommend Lovenox at prophylactic dosing for 6 weeks postpartum if and only if she has a  section  I recommend attempting to avoid  section as always to reduce her risk for VTE  Given the patient's history of nulliparity and maternal history of preeclampsia, I recommend initiating low dose aspirin therapy  A recent meta-analysis yielded risk reductions of 24% for preeclampsia, 20% for intrauterine growth restriction, and 14% for  birth, with an absolute risk reduction of 2-5% for preeclampsia, one to 5% for intrauterine growth restriction, and 2-4% for  birth    In this study, there was no identified risk of harm to the mother or fetus but long-term evidence was somewhat limited  Given the overall safety profile and risk-benefit analysis, I recommend 162 mg of aspirin be taken Daily and discontinued at around 36 weeks gestation or 2-3 weeks prior to planned delivery  I reviewed these recommendations with the patient and answered all of her questions to apparent satisfaction  We discussed follow-up in detail and she is scheduled for a level 2 ultrasound  Thank you very much for allowing us to participate in the care of this very nice patient  Should you have any questions, please do not hesitate to contact our office

## 2023-01-18 NOTE — LETTER
2023     Des Bailon MD  [address] [facility] [place] [address]    Patient: Freeman Lopez   YOB: 2000   Date of Visit: 2023       Dear Dr Sid Win: Thank you for referring Staci Ashton to me for evaluation  Below are my notes for this consultation  If you have questions, please do not hesitate to call me  I look forward to following your patient along with you  Sincerely,        Tammie Hunt MD        CC: No Recipients  Tammie Hunt MD  2023  9:32 AM  Sign when Signing Visit  Homar Chandler presents today for a maternal-fetal medicine evaluation and nuchal translucency ultrasound  She recently had noninvasive prenatal screening drawn through her doctor's office and results are pending  Homar Chandler is known to have factor V Leiden heterozygote drawn because her father had this condition  She has never had a venous thromboembolism  She has a history of a lumpectomy, wisdom teeth extraction, and tonsillectomy  She currently takes prenatal vitamins and she has no known drug allergies  Her substance use history is unremarkable  Family history is significant for her father who had blood clots and was found to be factor V Leiden heterozygote  There is a maternal history of preeclampsia  Her mother, who accompanied her to the visit today, had preeclampsia with both Homar Chandler and Maria Dolores's brother  Maria Dolores's brother and father also have a history of opiate use and alcohol use disorder, respectively  A review of systems is otherwise negative  I reviewed with Homar Chandler her history of factor V Leiden heterozygote  This is a low risk thrombophilia  She has never had a venous thromboembolism  In general, antepartum anticoagulation is not indicated and postpartum anticoagulation is only indicated if Homar Chandler undergoes  section  I recommend Lovenox at prophylactic dosing for 6 weeks postpartum if and only if she has a  section    I recommend attempting to avoid  section as always to reduce her risk for VTE  Given the patient's history of nulliparity and maternal history of preeclampsia, I recommend initiating low dose aspirin therapy  A recent meta-analysis yielded risk reductions of 24% for preeclampsia, 20% for intrauterine growth restriction, and 14% for  birth, with an absolute risk reduction of 2-5% for preeclampsia, one to 5% for intrauterine growth restriction, and 2-4% for  birth  In this study, there was no identified risk of harm to the mother or fetus but long-term evidence was somewhat limited  Given the overall safety profile and risk-benefit analysis, I recommend 162 mg of aspirin be taken Daily and discontinued at around 36 weeks gestation or 2-3 weeks prior to planned delivery  I reviewed these recommendations with the patient and answered all of her questions to apparent satisfaction  We discussed follow-up in detail and she is scheduled for a level 2 ultrasound  Thank you very much for allowing us to participate in the care of this very nice patient  Should you have any questions, please do not hesitate to contact our office

## 2023-01-19 DIAGNOSIS — H69.83 DYSFUNCTION OF BOTH EUSTACHIAN TUBES: ICD-10-CM

## 2023-01-19 DIAGNOSIS — H65.02 NON-RECURRENT ACUTE SEROUS OTITIS MEDIA OF LEFT EAR: ICD-10-CM

## 2023-01-19 RX ORDER — FLUTICASONE PROPIONATE 50 MCG
SPRAY, SUSPENSION (ML) NASAL
Qty: 48 ML | Refills: 2 | Status: SHIPPED | OUTPATIENT
Start: 2023-01-19

## 2023-02-26 PROBLEM — H65.02 NON-RECURRENT ACUTE SEROUS OTITIS MEDIA OF LEFT EAR: Status: RESOLVED | Noted: 2022-12-28 | Resolved: 2023-02-26

## 2023-03-13 ENCOUNTER — ROUTINE PRENATAL (OUTPATIENT)
Dept: PERINATAL CARE | Facility: OTHER | Age: 23
End: 2023-03-13

## 2023-03-13 VITALS
BODY MASS INDEX: 23.22 KG/M2 | DIASTOLIC BLOOD PRESSURE: 62 MMHG | WEIGHT: 123 LBS | SYSTOLIC BLOOD PRESSURE: 131 MMHG | HEART RATE: 100 BPM | HEIGHT: 61 IN

## 2023-03-13 DIAGNOSIS — Z36.3 ENCOUNTER FOR ANTENATAL SCREENING FOR MALFORMATION USING ULTRASOUND: Primary | ICD-10-CM

## 2023-03-13 DIAGNOSIS — Z3A.20 20 WEEKS GESTATION OF PREGNANCY: ICD-10-CM

## 2023-03-13 DIAGNOSIS — Z36.86 ENCOUNTER FOR ANTENATAL SCREENING FOR CERVICAL LENGTH: ICD-10-CM

## 2023-03-13 NOTE — PROGRESS NOTES
Ultrasound Probe Disinfection    A transvaginal ultrasound was performed  Prior to use, disinfection was performed with High Level Disinfection Process (Trophon)  Probe serial number [id number] was used        Majo Perry  03/13/23  1:33 PM

## 2025-07-17 ENCOUNTER — OFFICE VISIT (OUTPATIENT)
Dept: PODIATRY | Facility: CLINIC | Age: 25
End: 2025-07-17
Payer: COMMERCIAL

## 2025-07-17 VITALS — BODY MASS INDEX: 24.84 KG/M2 | HEIGHT: 62 IN

## 2025-07-17 DIAGNOSIS — L60.9 NAIL ABNORMALITY: Primary | ICD-10-CM

## 2025-07-17 PROCEDURE — 99203 OFFICE O/P NEW LOW 30 MIN: CPT | Performed by: PODIATRIST

## 2025-07-17 NOTE — PROGRESS NOTES
Name: Maria Dolores Martin      : 2000      MRN: [id number]  Encounter Provider: Lindsey Hanley DPM  Encounter Date: 2025   Encounter department: [facility] [place]  :  Assessment & Plan  Nail abnormality    Initial pedal examination with socks and shoes removed bilaterally.  Today we discussed etiology and treatment options of toenail abnormality likely from previous trauma.  I inspected patient's toe nailbed, new nail growth, reviewed photograph of nail abnormality on patient cell phone, I explained to her she likely had some old trauma, subungual hematoma, new nail grew in under and caused nail lysis, I do not believe there are any fungal elements at this time, I did explain to her there is risk of the new nail growing back discolored, disfigured.  It might take 6 months to a year for the new nail to grow.  We will continue to monitor.  She has been applying topical antifungal, I explained to her there is no harm in continuing this.  She is to leave her nailbed open to air, no need for any dressings as there are no open areas.  Patient understands and agrees with the plan and will follow-up as needed.           History of Present Illness   HPI  Maria Dolores Martin is a 24 y.o. female who presents today for initial pedal examination, evaluation and care of toenail which fell off, she states she did have trauma to her left great toenail, it was discolored, there was some dried blood underneath, she started applying topical antifungal subsequently the nail fell off.  She has been caring for the area by applying triple antibiotic ointment and a Band-Aid.     History obtained from: patient    Review of Systems   Constitutional:  Negative for chills and fever.   HENT:  Negative for ear pain and sore throat.    Eyes:  Negative for pain and visual disturbance.   Respiratory:  Negative for cough and shortness of breath.    Cardiovascular:  Negative for chest pain and palpitations.   Gastrointestinal:   Negative for abdominal pain and vomiting.   Genitourinary:  Negative for dysuria and hematuria.   Musculoskeletal:  Negative for arthralgias and back pain.   Skin:  Negative for color change and rash.        Toenail abnormality   Neurological:  Negative for seizures and syncope.   All other systems reviewed and are negative.      Medical History Reviewed by provider this encounter:     .     Objective   There were no vitals taken for this visit.     Physical Exam  Vitals and nursing note reviewed.   Constitutional:       General: She is not in acute distress.     Appearance: Normal appearance. She is well-developed and normal weight.   HENT:      Head: Normocephalic and atraumatic.      Nose: Nose normal.      Mouth/Throat:      Mouth: Mucous membranes are moist.     Eyes:      Conjunctiva/sclera: Conjunctivae normal.      Pupils: Pupils are equal, round, and reactive to light.       Cardiovascular:      Pulses:           Dorsalis pedis pulses are 2+ on the right side and 2+ on the left side.        Posterior tibial pulses are 2+ on the right side and 2+ on the left side.      Heart sounds: No murmur heard.  Pulmonary:      Effort: Pulmonary effort is normal. No respiratory distress.      Breath sounds: Normal breath sounds.   Abdominal:      Palpations: Abdomen is soft.      Tenderness: There is no abdominal tenderness.     Musculoskeletal:         General: No swelling.      Cervical back: Neck supple.      Right lower leg: No edema.      Left lower leg: No edema.   Feet:      Right foot:      Protective Sensation: 10 sites tested.  10 sites sensed.      Skin integrity: Skin integrity normal.      Left foot:      Protective Sensation: 10 sites tested.  10 sites sensed.      Skin integrity: Skin integrity normal.      Comments: Left great toe nail with lysis of toenail, healthy new proximal nail growth, nailbed is intact, there are a few ridges in the skin likely from the microtrauma which caused her nail to fall  off.  Remainder of toenails are in good repair, no signs of onychomycoses, paronychia or infection noted.    Skin:     General: Skin is warm and dry.      Capillary Refill: Capillary refill takes less than 2 seconds.     Neurological:      General: No focal deficit present.      Mental Status: She is alert. Mental status is at baseline.     Psychiatric:         Mood and Affect: Mood normal.         Behavior: Behavior normal.         Thought Content: Thought content normal.         Judgment: Judgment normal.